# Patient Record
Sex: FEMALE | Race: BLACK OR AFRICAN AMERICAN | NOT HISPANIC OR LATINO | Employment: UNEMPLOYED | URBAN - METROPOLITAN AREA
[De-identification: names, ages, dates, MRNs, and addresses within clinical notes are randomized per-mention and may not be internally consistent; named-entity substitution may affect disease eponyms.]

---

## 2017-03-17 ENCOUNTER — OFFICE VISIT (OUTPATIENT)
Dept: OBSTETRICS AND GYNECOLOGY | Facility: CLINIC | Age: 42
End: 2017-03-17
Payer: COMMERCIAL

## 2017-03-17 VITALS
SYSTOLIC BLOOD PRESSURE: 130 MMHG | DIASTOLIC BLOOD PRESSURE: 80 MMHG | WEIGHT: 208.75 LBS | BODY MASS INDEX: 32.76 KG/M2 | HEIGHT: 67 IN

## 2017-03-17 DIAGNOSIS — K61.1 PERIRECTAL ABSCESS: Primary | ICD-10-CM

## 2017-03-17 PROCEDURE — 87070 CULTURE OTHR SPECIMN AEROBIC: CPT

## 2017-03-17 PROCEDURE — 99214 OFFICE O/P EST MOD 30 MIN: CPT | Mod: S$GLB,,, | Performed by: NURSE PRACTITIONER

## 2017-03-17 PROCEDURE — 99999 PR PBB SHADOW E&M-EST. PATIENT-LVL III: CPT | Mod: PBBFAC,,, | Performed by: NURSE PRACTITIONER

## 2017-03-17 PROCEDURE — 1160F RVW MEDS BY RX/DR IN RCRD: CPT | Mod: S$GLB,,, | Performed by: NURSE PRACTITIONER

## 2017-03-17 RX ORDER — LOSARTAN POTASSIUM 100 MG/1
100 TABLET ORAL
COMMUNITY
Start: 2016-06-03 | End: 2017-03-17

## 2017-03-17 RX ORDER — SULFAMETHOXAZOLE AND TRIMETHOPRIM 800; 160 MG/1; MG/1
1 TABLET ORAL 2 TIMES DAILY
Qty: 20 TABLET | Refills: 0 | Status: SHIPPED | OUTPATIENT
Start: 2017-03-17 | End: 2017-03-27

## 2017-03-17 RX ORDER — VALACYCLOVIR HYDROCHLORIDE 1 G/1
TABLET, FILM COATED ORAL
COMMUNITY
Start: 2016-06-10 | End: 2017-03-17

## 2017-03-17 RX ORDER — AMLODIPINE BESYLATE 10 MG/1
10 TABLET ORAL
COMMUNITY
End: 2017-03-17

## 2017-03-17 NOTE — PROGRESS NOTES
"    Radha Medina is a 41 y.o. female  presents for boil to vaginal region since July - drains off and on but has gotten slightly larger and very tender.  Had seen PCP yesterday and was called rd and started on doxycyline which pt did not start - she did take a dose of something she had in cabinet that was 875 mg - counseled pt on taking old medication and not completing it in first place.  LMP: No LMP recorded. Patient has had a hysterectomy..      Past Medical History:   Diagnosis Date    Abnormal Pap smear of cervix     Herpes simplex virus (HSV) infection     Hypertension      Past Surgical History:   Procedure Laterality Date    HYSTERECTOMY      [facility] for hypermen and abnl paps     Social History     Social History    Marital status:      Spouse name: N/A    Number of children: N/A    Years of education: N/A     Occupational History    Not on file.     Social History Main Topics    Smoking status: Never Smoker    Smokeless tobacco: Not on file    Alcohol use No    Drug use: No    Sexual activity: Yes     Partners: Male      Comment:      Other Topics Concern    Not on file     Social History Narrative     Family History   Problem Relation Age of Onset    Diabetes Maternal Grandmother     Diabetes Mother     Breast cancer Neg Hx     Colon cancer Neg Hx     Ovarian cancer Neg Hx      OB History      Para Term  AB TAB SAB Ectopic Multiple Living    2         2          /80  Ht 5' 7" (1.702 m)  Wt 94.7 kg (208 lb 12.4 oz)  BMI 32.7 kg/m2      ROS:  Per hpi    PHYSICAL EXAM:  APPEARANCE: Well nourished, well developed, in no acute distress.  AFFECT: WNL, alert and oriented x 3  SKIN: No acne or hirsutism  PELVIC: abscess appears to be more perirectal just to left side of rectum on left buttock - 3 raised areas of abscess lesions 2 of which are draining - one was opened with 18 gauge needle and thick purelent drainage was noted and " cultured   Physical Exam:               Genitourinary:                           1. Perirectal abscess  sulfamethoxazole-trimethoprim 800-160mg (BACTRIM DS) 800-160 mg Tab    AND PLAN:  Warm soaks in tub bid to tid     Recommend at this point needs to see general surgery for assessment if needs to be surgically I&D due to size and depth and due to length of time has been bothering her (10 months)

## 2017-03-17 NOTE — MR AVS SNAPSHOT
[facility] - OB/ GYN  [address] [facility] [address] [place] [address]  Phone: [phone]  Fax: [phone]                  Radha Medina   3/17/2017 10:45 AM   Office Visit    Description:  Female : 1975   Provider:  Ronit Wilcox NP   Department:  [facility] - OB/ GYN           Reason for Visit     boil in vaginal area           Diagnoses this Visit        Comments    Perirectal abscess    -  Primary            To Do List           Future Appointments        Provider Department Dept Phone    3/20/2017 11:20 AM Peter Clement MD [facility] General Surgery [phone]      Goals (5 Years of Data)     None       These Medications        Disp Refills Start End    sulfamethoxazole-trimethoprim 800-160mg (BACTRIM DS) 800-160 mg Tab 20 tablet 0 3/17/2017 3/27/2017    Take 1 tablet by mouth 2 (two) times daily. - Oral    Pharmacy: [facility] [place] [address] Ph #: [phone]         [facility] On Call     [facility] On Call Nurse Care Line -  Assistance  Registered nurses in the [facility] On Call Center provide clinical advisement, health education, appointment booking, and other advisory services.  Call for this free service at [phone].             Medications           Message regarding Medications     Verify the changes and/or additions to your medication regime listed below are the same as discussed with your clinician today.  If any of these changes or additions are incorrect, please notify your healthcare provider.        START taking these NEW medications        Refills    sulfamethoxazole-trimethoprim 800-160mg (BACTRIM DS) 800-160 mg Tab 0    Sig: Take 1 tablet by mouth 2 (two) times daily.    Class: Normal    Route: Oral      STOP taking these medications     olopatadine (PATANOL) 0.1 % ophthalmic solution 1 drop.           Verify that the below list of medications is an accurate representation of the medications you are currently taking.  If none reported, the  "list may be blank. If incorrect, please contact your healthcare provider. Carry this list with you in case of emergency.           Current Medications     amlodipine (NORVASC) 10 MG tablet Take 10 mg by mouth.    clotrimazole-betamethasone (LOTRISONE) lotion Apply topically.    losartan (COZAAR) 100 MG tablet Take 100 mg by mouth.    valacyclovir (VALTREX) 1000 MG tablet TAKE 1 TABLET BY MOUTH TWICE DAILY    nebivolol (BYSTOLIC) 10 MG Tab Take 10 mg by mouth.    sulfamethoxazole-trimethoprim 800-160mg (BACTRIM DS) 800-160 mg Tab Take 1 tablet by mouth 2 (two) times daily.           Clinical Reference Information           Your Vitals Were     BP Height Weight BMI       130/80 5' 7" (1.702 m) 94.7 kg (208 lb 12.4 oz) 32.7 kg/m2       Blood Pressure          Most Recent Value    BP  130/80      Allergies as of 3/17/2017     No Known Allergies      Immunizations Administered on Date of Encounter - 3/17/2017     None      Language Assistance Services     ATTENTION: Language assistance services are available, free of charge. Please call [phone].      ATENCIÓN: Si chiara mccracken, tiene a reis disposición servicios gratuitos de asistencia lingüística. Llame al [phone].     CARRI Ý: N?u b?n nói Ti?ng Vi?t, có các d?ch v? h? tr? ngôn ng? mi?n phí dành cho b?n. G?i s? [phone].         [facility] - [facility]/ [facility] complies with applicable Federal civil rights laws and does not discriminate on the basis of race, color, national origin, age, disability, or sex.        "

## 2017-03-20 ENCOUNTER — TELEPHONE (OUTPATIENT)
Dept: OBSTETRICS AND GYNECOLOGY | Facility: CLINIC | Age: 42
End: 2017-03-20

## 2017-03-20 ENCOUNTER — LAB VISIT (OUTPATIENT)
Dept: LAB | Facility: HOSPITAL | Age: 42
End: 2017-03-20
Attending: SURGERY
Payer: COMMERCIAL

## 2017-03-20 ENCOUNTER — CLINICAL SUPPORT (OUTPATIENT)
Dept: CARDIOLOGY | Facility: CLINIC | Age: 42
End: 2017-03-20
Payer: COMMERCIAL

## 2017-03-20 ENCOUNTER — OFFICE VISIT (OUTPATIENT)
Dept: SURGERY | Facility: CLINIC | Age: 42
End: 2017-03-20
Payer: COMMERCIAL

## 2017-03-20 VITALS
HEART RATE: 72 BPM | DIASTOLIC BLOOD PRESSURE: 90 MMHG | WEIGHT: 207 LBS | TEMPERATURE: 98 F | BODY MASS INDEX: 32.42 KG/M2 | SYSTOLIC BLOOD PRESSURE: 144 MMHG

## 2017-03-20 DIAGNOSIS — L73.2 HIDRADENITIS SUPPURATIVA OF ANUS: ICD-10-CM

## 2017-03-20 DIAGNOSIS — L73.2 HIDRADENITIS SUPPURATIVA OF ANUS: Primary | ICD-10-CM

## 2017-03-20 LAB
ANION GAP SERPL CALC-SCNC: 11 MMOL/L
BACTERIA GENITAL AEROBE CULT: NORMAL
BASOPHILS # BLD AUTO: 0.02 K/UL
BASOPHILS NFR BLD: 0.2 %
BUN SERPL-MCNC: 14 MG/DL
CALCIUM SERPL-MCNC: 9.7 MG/DL
CHLORIDE SERPL-SCNC: 102 MMOL/L
CO2 SERPL-SCNC: 28 MMOL/L
CREAT SERPL-MCNC: 1.3 MG/DL
DIFFERENTIAL METHOD: ABNORMAL
EOSINOPHIL # BLD AUTO: 0.2 K/UL
EOSINOPHIL NFR BLD: 1.8 %
ERYTHROCYTE [DISTWIDTH] IN BLOOD BY AUTOMATED COUNT: 13.9 %
EST. GFR  (AFRICAN AMERICAN): 58.1 ML/MIN/1.73 M^2
EST. GFR  (NON AFRICAN AMERICAN): 50.4 ML/MIN/1.73 M^2
GLUCOSE SERPL-MCNC: 105 MG/DL
HCT VFR BLD AUTO: 38.7 %
HGB BLD-MCNC: 12.3 G/DL
LYMPHOCYTES # BLD AUTO: 2.6 K/UL
LYMPHOCYTES NFR BLD: 26.3 %
MCH RBC QN AUTO: 27.6 PG
MCHC RBC AUTO-ENTMCNC: 31.8 %
MCV RBC AUTO: 87 FL
MONOCYTES # BLD AUTO: 0.5 K/UL
MONOCYTES NFR BLD: 5.3 %
NEUTROPHILS # BLD AUTO: 6.4 K/UL
NEUTROPHILS NFR BLD: 66 %
PLATELET # BLD AUTO: 314 K/UL
PMV BLD AUTO: 10.7 FL
POTASSIUM SERPL-SCNC: 3.1 MMOL/L
RBC # BLD AUTO: 4.46 M/UL
SODIUM SERPL-SCNC: 141 MMOL/L
WBC # BLD AUTO: 9.73 K/UL

## 2017-03-20 PROCEDURE — 85025 COMPLETE CBC W/AUTO DIFF WBC: CPT

## 2017-03-20 PROCEDURE — 93000 ELECTROCARDIOGRAM COMPLETE: CPT | Mod: S$GLB,,, | Performed by: NUCLEAR MEDICINE

## 2017-03-20 PROCEDURE — 1160F RVW MEDS BY RX/DR IN RCRD: CPT | Mod: S$GLB,,, | Performed by: SURGERY

## 2017-03-20 PROCEDURE — 99203 OFFICE O/P NEW LOW 30 MIN: CPT | Mod: S$GLB,,, | Performed by: SURGERY

## 2017-03-20 PROCEDURE — 99999 PR PBB SHADOW E&M-EST. PATIENT-LVL III: CPT | Mod: PBBFAC,,, | Performed by: SURGERY

## 2017-03-20 PROCEDURE — 80048 BASIC METABOLIC PNL TOTAL CA: CPT

## 2017-03-20 PROCEDURE — 36415 COLL VENOUS BLD VENIPUNCTURE: CPT | Mod: PO

## 2017-03-20 RX ORDER — SODIUM CHLORIDE 9 MG/ML
INJECTION, SOLUTION INTRAVENOUS CONTINUOUS
Status: CANCELLED | OUTPATIENT
Start: 2017-03-20

## 2017-03-20 NOTE — TELEPHONE ENCOUNTER
----- Message from Shavon Chaney sent at 3/20/2017  9:49 AM CDT -----  Contact: [facility]  Caller request call from nurse regarding RX for Bactrim that pt states they should have received but pharmacy states they do not have...  [facility] - [place] - [address]  [address]  [place] [address]  Phone: [phone] Fax: [phone]

## 2017-03-20 NOTE — LETTER
March 20, 2017      Ronit Wilcox, NP  [address] [place] [address]           [facility] General Surgery  [address] [place] [address]  Phone: [phone]  Fax: [phone]          Patient: Radha Medina   MR Number: [id number]   YOB: 1975   Date of Visit: 3/20/2017       Dear Ronit Wilcox:    Thank you for referring Radha Medina to me for evaluation. Attached you will find relevant portions of my assessment and plan of care.    If you have questions, please do not hesitate to call me. I look forward to following Radha Medina along with you.    Sincerely,    Peter Clement MD    Enclosure  CC:  No Recipients    If you would like to receive this communication electronically, please contact [email or web address] or [phone] to request more information on [employer] [employer] access.    For providers and/or their staff who would like to refer a patient to [facility], please contact us through our one-stop-shop provider referral line, Jing Toscano, at [phone].    If you feel you have received this communication in error or would no longer like to receive these types of communications, please e-mail [email or web address]

## 2017-03-20 NOTE — PROGRESS NOTES
History & Physical    SUBJECTIVE:     History of Present Illness:  Patient is a 41 y.o. female referred for hidradenitis. The patient reports left inner thigh chronic abscess that swells and drains intermittently. Despite previous trials of antibiotics this has persisted for the past 10 months.     Chief Complaint   Patient presents with    Consult     Lumps on buttocks       Review of patient's allergies indicates:  No Known Allergies    Current Outpatient Prescriptions   Medication Sig Dispense Refill    amlodipine (NORVASC) 10 MG tablet Take 10 mg by mouth.      clotrimazole-betamethasone (LOTRISONE) lotion Apply topically.      losartan (COZAAR) 100 MG tablet Take 100 mg by mouth.      sulfamethoxazole-trimethoprim 800-160mg (BACTRIM DS) 800-160 mg Tab Take 1 tablet by mouth 2 (two) times daily. 20 tablet 0    valacyclovir (VALTREX) 1000 MG tablet TAKE 1 TABLET BY MOUTH TWICE DAILY      nebivolol (BYSTOLIC) 10 MG Tab Take 10 mg by mouth.       No current facility-administered medications for this visit.        Past Medical History:   Diagnosis Date    Abnormal Pap smear of cervix     Herpes simplex virus (HSV) infection     Hypertension      Past Surgical History:   Procedure Laterality Date    HYSTERECTOMY  2011    [facility] for hypermen and abnl paps     Family History   Problem Relation Age of Onset    Diabetes Maternal Grandmother     Diabetes Mother     Breast cancer Neg Hx     Colon cancer Neg Hx     Ovarian cancer Neg Hx      Social History   Substance Use Topics    Smoking status: Never Smoker    Smokeless tobacco: None    Alcohol use No        Review of Systems:  Review of Systems   Constitutional: Negative for chills and fever.   HENT: Negative for congestion.    Eyes: Negative for visual disturbance.   Respiratory: Negative for cough and shortness of breath.    Cardiovascular: Negative for chest pain, palpitations and leg swelling.   Gastrointestinal: Negative for abdominal distention,  abdominal pain, constipation, diarrhea, nausea and vomiting.   Endocrine: Negative for polyuria.   Genitourinary: Negative for dysuria.   Skin: Positive for wound. Negative for rash.   Neurological: Negative for dizziness and light-headedness.   Hematological: Negative for adenopathy.       OBJECTIVE:     Vital Signs (Most Recent)  Temp: 98.1 °F (36.7 °C) (03/20/17 1057)  Pulse: 72 (03/20/17 1057)  BP: (!) 144/90 (03/20/17 1057)     93.9 kg (207 lb 0.2 oz)     Physical Exam:  Physical Exam   Constitutional: She is oriented to person, place, and time. She appears well-developed and well-nourished.   HENT:   Head: Normocephalic and atraumatic.   Eyes: EOM are normal.   Neck: Neck supple.   Cardiovascular: Normal rate and regular rhythm.    Pulmonary/Chest: Effort normal and breath sounds normal.   Abdominal: Soft. Bowel sounds are normal. She exhibits no distension. There is no tenderness.   Musculoskeletal:   Left inner thigh hidradenitis   Neurological: She is alert and oriented to person, place, and time.   Skin: Skin is warm and dry.   Vitals reviewed.      ASSESSMENT/PLAN:     42 y/o female with hidradenitis of the left inner thigh     PLAN:Plan     -Excision hidradenitis 3/23/17  -Preop: cbc, bmp, ekg, UPT morning of surgery  -Risks and benefits discussed with patient including pain, bleeding, infection, delayed wound healing, recurrence

## 2017-03-20 NOTE — MR AVS SNAPSHOT
[facility]  [address] [address]  Phone: [phone]  Fax: [phone]                  Radha Medina   3/20/2017 11:20 AM   Office Visit    Description:  Female : 1975   Provider:  Peter Clement MD   Department:  [facility]           Reason for Visit     Consult           Diagnoses this Visit        Comments    Hidradenitis suppurativa of anus    -  Primary            To Do List           Future Appointments        Provider Department Dept Phone    3/20/2017 12:00 PM LAB, SAME DAY [facility] [phone]    [facility]20[facility]2017 12[facility]20 [phone]    4/3/2017 10:40 AM Peter Clement MD [facility] [phone]      Your Future Surgeries/Procedures     Mar 23, 2017   Surgery with Peter Clement MD   [facility]  ([facility])    [address] [address] [phone]              Goals (5 Years of Data)     None      [facility] On Call     [facility] On [facility] Nurse Care Line -  Assistance  Registered nurses in the [facility] On Call Center provide clinical advisement, health education, appointment booking, and other advisory services.  Call for this free service at [phone].             Medications           Message regarding Medications     Verify the changes and/or additions to your medication regime listed below are the same as discussed with your clinician today.  If any of these changes or additions are incorrect, please notify your healthcare provider.             Verify that the below list of medications is an accurate representation of the medications you are currently taking.  If none reported, the list may be blank. If incorrect, please contact your healthcare provider. Carry this list with you in case of emergency.           Current Medications     amlodipine (NORVASC) 10 MG tablet Take 10 mg by mouth.    clotrimazole-betamethasone  (LOTRISONE) lotion Apply topically.    losartan (COZAAR) 100 MG tablet Take 100 mg by mouth.    sulfamethoxazole-trimethoprim 800-160mg (BACTRIM DS) 800-160 mg Tab Take 1 tablet by mouth 2 (two) times daily.    valacyclovir (VALTREX) 1000 MG tablet TAKE 1 TABLET BY MOUTH TWICE DAILY    nebivolol (BYSTOLIC) 10 MG Tab Take 10 mg by mouth.           Clinical Reference Information           Your Vitals Were     BP Pulse Temp Weight BMI    144/90 (BP Location: Right arm, Patient Position: Sitting) 72 98.1 °F (36.7 °C) (Oral) 93.9 kg (207 lb 0.2 oz) 32.42 kg/m2      Blood Pressure          Most Recent Value    BP  (!)  144/90      Allergies as of 3/20/2017     No Known Allergies      Immunizations Administered on Date of Encounter - 3/20/2017     None      Orders Placed During Today's Visit      Normal Orders This Visit    Case Request Operating Room: EXCISION-SKIN     Future Labs/Procedures Expected by Expires    Basic metabolic panel  3/20/2017 5/19/2018    CBC auto differential  3/20/2017 5/19/2018    ECG 12 lead  As directed 3/20/2018      Language Assistance Services     ATTENTION: Language assistance services are available, free of charge. Please call [phone].      ATENCIÓN: Si habla nawaf, tiene a reis disposición servicios gratuitos de asistencia lingüística. Llame al [phone].     [facility] Ý: N?u b?n nói Ti?ng Vi?t, có các d?ch v? h? tr? ngôn ng? mi?n phí dành cho b?n. G?i s? [phone].         [facility] complies with applicable Federal civil rights laws and does not discriminate on the basis of race, color, national origin, age, disability, or sex.

## 2017-03-21 NOTE — PRE-PROCEDURE INSTRUCTIONS
Pre op instructions reviewed with patient per phone:    To confirm, Your surgeon has instructed you:  Surgery is scheduled 3/23/17 at 0900.      Please report to [facility] 1st floor main lobby by 0730 Pre admit office will call day prior to surgery for final arrival time.      INSTRUCTIONS IMPORTANT!!!  ¨ Do not eat, drink, or smoke after 12 midnight-including water. OK to brush teeth, no gum, candy or mints!    ¨ Take only these medicines with a small swallow of water-morning of surgery.  Amlodipine, Losartan     ____  Do not wear makeup, including mascara.  ____  No powder, lotions or creams to surgical area.  ____  Please remove all jewelry, including piercings and leave at home.  ____  No money or valuables needed. Please leave at home.  ____  Please bring identification and insurance information to hospital.  ____  If going home the same day, arrange for a ride home. You will not be able to   drive if Anesthesia was used.  ____  Children, under 12 years old, must remain in the waiting room with an adult.  They are not allowed in patient areas.  ____  Wear loose fitting clothing. Allow for dressings, bandages.  ____  Stop Aspirin, Ibuprofen, Motrin and Aleve at least 5-7 days before surgery, unless otherwise instructed by your doctor, or the nurse.   You MAY use Tylenol/acetaminophen until day of surgery.  ____  If you take diabetic medication, do not take am of surgery unless instructed by   Doctor.  ____ Stop taking any Fish Oil supplement or any Vitamins that contain Vitamin E at least 5 days prior to surgery.          Bathing Instructions-- The night before surgery and the morning prior to coming to the hospital:   -Do not shave the surgical area.   -Shower and wash your hair and body as usual with anti-bacterial  soap and shampoo.   -Rinse your hair and body completely.   -Use one packet of hibiclens to wash the surgical site (using your hand) gently for 5 minutes.  Do not scrub  you skin too hard.   -Do not use hibiclens on your head, face, or genitals.   -Do not wash with anti-bacterial soap after you use the hibiclens.   -Rinse your body thoroughly.   -Dry with clean, soft towel.  Do not use lotion, cream, deodorant, or powders on   the surgical site.    Use antibacterial soap in place of hibiclens if your surgery is on the head, face or genitals.         Surgical Site Infection    Prevention of surgical site infections:     -Keep incisions clean and dry.   -Do not soak/submerge incisions in water until completely healed.   -Do not apply lotions, powders, creams, or deodorants to site.   -Always make sure hands are cleaned with antibacterial soap/ alcohol-based   prior to touching the surgical site.  (This includes doctors, nurses, staff, and yourself.)    Signs and symptoms:   -Redness and pain around the area where you had surgery   -Drainage of cloudy fluid from your surgical wound   -Fever over 100.4  I have read or had read and explained to me, and understand the above information.

## 2017-03-22 ENCOUNTER — ANESTHESIA EVENT (OUTPATIENT)
Dept: SURGERY | Facility: HOSPITAL | Age: 42
End: 2017-03-22
Payer: COMMERCIAL

## 2017-03-23 ENCOUNTER — ANESTHESIA (OUTPATIENT)
Dept: SURGERY | Facility: HOSPITAL | Age: 42
End: 2017-03-23
Payer: COMMERCIAL

## 2017-03-23 ENCOUNTER — SURGERY (OUTPATIENT)
Age: 42
End: 2017-03-23

## 2017-03-23 ENCOUNTER — HOSPITAL ENCOUNTER (OUTPATIENT)
Facility: HOSPITAL | Age: 42
Discharge: HOME | End: 2017-03-23
Attending: SURGERY | Admitting: SURGERY
Payer: COMMERCIAL

## 2017-03-23 VITALS
OXYGEN SATURATION: 100 % | DIASTOLIC BLOOD PRESSURE: 88 MMHG | SYSTOLIC BLOOD PRESSURE: 137 MMHG | WEIGHT: 206.81 LBS | BODY MASS INDEX: 33.24 KG/M2 | TEMPERATURE: 98 F | HEART RATE: 72 BPM | RESPIRATION RATE: 12 BRPM | HEIGHT: 66 IN

## 2017-03-23 DIAGNOSIS — L73.2 HIDRADENITIS SUPPURATIVA OF ANUS: ICD-10-CM

## 2017-03-23 PROCEDURE — 37000009 HC ANESTHESIA EA ADD 15 MINS: Performed by: SURGERY

## 2017-03-23 PROCEDURE — 71000015 HC POSTOP RECOV 1ST HR: Performed by: SURGERY

## 2017-03-23 PROCEDURE — 88305 TISSUE EXAM BY PATHOLOGIST: CPT | Mod: 26,,, | Performed by: PATHOLOGY

## 2017-03-23 PROCEDURE — 36000707: Performed by: SURGERY

## 2017-03-23 PROCEDURE — 25000003 PHARM REV CODE 250: Performed by: ANESTHESIOLOGY

## 2017-03-23 PROCEDURE — 25000003 PHARM REV CODE 250: Performed by: SURGERY

## 2017-03-23 PROCEDURE — 63600175 PHARM REV CODE 636 W HCPCS: Performed by: NURSE ANESTHETIST, CERTIFIED REGISTERED

## 2017-03-23 PROCEDURE — 25000003 PHARM REV CODE 250: Performed by: NURSE ANESTHETIST, CERTIFIED REGISTERED

## 2017-03-23 PROCEDURE — 88305 TISSUE EXAM BY PATHOLOGIST: CPT | Performed by: PATHOLOGY

## 2017-03-23 PROCEDURE — 37000008 HC ANESTHESIA 1ST 15 MINUTES: Performed by: SURGERY

## 2017-03-23 PROCEDURE — 11470 EXC SKN H/P/P/U SMPL/NTRM: CPT | Mod: ,,, | Performed by: SURGERY

## 2017-03-23 PROCEDURE — 71000033 HC RECOVERY, INTIAL HOUR: Performed by: SURGERY

## 2017-03-23 PROCEDURE — 63600175 PHARM REV CODE 636 W HCPCS: Performed by: SURGERY

## 2017-03-23 PROCEDURE — 71000039 HC RECOVERY, EACH ADD'L HOUR: Performed by: SURGERY

## 2017-03-23 PROCEDURE — 36000706: Performed by: SURGERY

## 2017-03-23 RX ORDER — FENTANYL CITRATE 50 UG/ML
25 INJECTION, SOLUTION INTRAMUSCULAR; INTRAVENOUS EVERY 5 MIN PRN
Status: DISCONTINUED | OUTPATIENT
Start: 2017-03-23 | End: 2017-03-23 | Stop reason: HOSPADM

## 2017-03-23 RX ORDER — SODIUM CHLORIDE 9 MG/ML
INJECTION, SOLUTION INTRAVENOUS CONTINUOUS
Status: DISCONTINUED | OUTPATIENT
Start: 2017-03-23 | End: 2017-03-23 | Stop reason: HOSPADM

## 2017-03-23 RX ORDER — LIDOCAINE HYDROCHLORIDE 10 MG/ML
1 INJECTION, SOLUTION EPIDURAL; INFILTRATION; INTRACAUDAL; PERINEURAL ONCE
Status: DISCONTINUED | OUTPATIENT
Start: 2017-03-23 | End: 2017-03-23 | Stop reason: HOSPADM

## 2017-03-23 RX ORDER — ROCURONIUM BROMIDE 10 MG/ML
INJECTION, SOLUTION INTRAVENOUS
Status: DISCONTINUED | OUTPATIENT
Start: 2017-03-23 | End: 2017-03-23

## 2017-03-23 RX ORDER — SODIUM CHLORIDE 9 MG/ML
3 INJECTION, SOLUTION INTRAMUSCULAR; INTRAVENOUS; SUBCUTANEOUS EVERY 8 HOURS
Status: DISCONTINUED | OUTPATIENT
Start: 2017-03-23 | End: 2017-03-23 | Stop reason: HOSPADM

## 2017-03-23 RX ORDER — MEPERIDINE HYDROCHLORIDE 50 MG/ML
12.5 INJECTION INTRAMUSCULAR; INTRAVENOUS; SUBCUTANEOUS ONCE AS NEEDED
Status: COMPLETED | OUTPATIENT
Start: 2017-03-23 | End: 2017-03-23

## 2017-03-23 RX ORDER — ONDANSETRON 2 MG/ML
INJECTION INTRAMUSCULAR; INTRAVENOUS
Status: DISCONTINUED | OUTPATIENT
Start: 2017-03-23 | End: 2017-03-23

## 2017-03-23 RX ORDER — BUPIVACAINE HYDROCHLORIDE 2.5 MG/ML
INJECTION, SOLUTION EPIDURAL; INFILTRATION; INTRACAUDAL
Status: DISCONTINUED | OUTPATIENT
Start: 2017-03-23 | End: 2017-03-23 | Stop reason: HOSPADM

## 2017-03-23 RX ORDER — ONDANSETRON 2 MG/ML
4 INJECTION INTRAMUSCULAR; INTRAVENOUS DAILY PRN
Status: DISCONTINUED | OUTPATIENT
Start: 2017-03-23 | End: 2017-03-23 | Stop reason: HOSPADM

## 2017-03-23 RX ORDER — DIPHENHYDRAMINE HYDROCHLORIDE 50 MG/ML
25 INJECTION INTRAMUSCULAR; INTRAVENOUS EVERY 6 HOURS PRN
Status: DISCONTINUED | OUTPATIENT
Start: 2017-03-23 | End: 2017-03-23 | Stop reason: HOSPADM

## 2017-03-23 RX ORDER — OXYCODONE HYDROCHLORIDE 5 MG/1
5 TABLET ORAL
Status: DISCONTINUED | OUTPATIENT
Start: 2017-03-23 | End: 2017-03-23 | Stop reason: HOSPADM

## 2017-03-23 RX ORDER — LIDOCAINE HYDROCHLORIDE 10 MG/ML
INJECTION, SOLUTION EPIDURAL; INFILTRATION; INTRACAUDAL; PERINEURAL
Status: DISCONTINUED | OUTPATIENT
Start: 2017-03-23 | End: 2017-03-23 | Stop reason: HOSPADM

## 2017-03-23 RX ORDER — LORAZEPAM 2 MG/ML
0.25 INJECTION INTRAMUSCULAR ONCE AS NEEDED
Status: DISCONTINUED | OUTPATIENT
Start: 2017-03-23 | End: 2017-03-23 | Stop reason: HOSPADM

## 2017-03-23 RX ORDER — ONDANSETRON 2 MG/ML
4 INJECTION INTRAMUSCULAR; INTRAVENOUS EVERY 12 HOURS PRN
Status: DISCONTINUED | OUTPATIENT
Start: 2017-03-23 | End: 2017-03-23 | Stop reason: HOSPADM

## 2017-03-23 RX ORDER — MORPHINE SULFATE 10 MG/ML
2 INJECTION INTRAMUSCULAR; INTRAVENOUS; SUBCUTANEOUS EVERY 4 HOURS PRN
Status: DISCONTINUED | OUTPATIENT
Start: 2017-03-23 | End: 2017-03-23 | Stop reason: HOSPADM

## 2017-03-23 RX ORDER — LIDOCAINE HYDROCHLORIDE 10 MG/ML
INJECTION INFILTRATION; PERINEURAL
Status: DISCONTINUED | OUTPATIENT
Start: 2017-03-23 | End: 2017-03-23

## 2017-03-23 RX ORDER — SUCCINYLCHOLINE CHLORIDE 20 MG/ML
INJECTION INTRAMUSCULAR; INTRAVENOUS
Status: DISCONTINUED | OUTPATIENT
Start: 2017-03-23 | End: 2017-03-23

## 2017-03-23 RX ORDER — HYDROMORPHONE HYDROCHLORIDE 2 MG/ML
0.2 INJECTION, SOLUTION INTRAMUSCULAR; INTRAVENOUS; SUBCUTANEOUS EVERY 5 MIN PRN
Status: DISCONTINUED | OUTPATIENT
Start: 2017-03-23 | End: 2017-03-23 | Stop reason: HOSPADM

## 2017-03-23 RX ORDER — SODIUM CHLORIDE, SODIUM LACTATE, POTASSIUM CHLORIDE, CALCIUM CHLORIDE 600; 310; 30; 20 MG/100ML; MG/100ML; MG/100ML; MG/100ML
INJECTION, SOLUTION INTRAVENOUS CONTINUOUS PRN
Status: DISCONTINUED | OUTPATIENT
Start: 2017-03-23 | End: 2017-03-23

## 2017-03-23 RX ORDER — MIDAZOLAM HYDROCHLORIDE 1 MG/ML
INJECTION, SOLUTION INTRAMUSCULAR; INTRAVENOUS
Status: DISCONTINUED | OUTPATIENT
Start: 2017-03-23 | End: 2017-03-23

## 2017-03-23 RX ORDER — HYDROCODONE BITARTRATE AND ACETAMINOPHEN 5; 325 MG/1; MG/1
1 TABLET ORAL EVERY 4 HOURS PRN
Qty: 20 TABLET | Refills: 0 | Status: SHIPPED | OUTPATIENT
Start: 2017-03-23 | End: 2017-04-18 | Stop reason: SDUPTHER

## 2017-03-23 RX ORDER — CEFAZOLIN SODIUM 2 G/50ML
2 SOLUTION INTRAVENOUS
Status: COMPLETED | OUTPATIENT
Start: 2017-03-23 | End: 2017-03-23

## 2017-03-23 RX ORDER — FENTANYL CITRATE 50 UG/ML
INJECTION, SOLUTION INTRAMUSCULAR; INTRAVENOUS
Status: DISCONTINUED | OUTPATIENT
Start: 2017-03-23 | End: 2017-03-23

## 2017-03-23 RX ORDER — HYDROCODONE BITARTRATE AND ACETAMINOPHEN 5; 325 MG/1; MG/1
2 TABLET ORAL EVERY 4 HOURS PRN
Status: DISCONTINUED | OUTPATIENT
Start: 2017-03-23 | End: 2017-03-23 | Stop reason: HOSPADM

## 2017-03-23 RX ORDER — PROPOFOL 10 MG/ML
VIAL (ML) INTRAVENOUS
Status: DISCONTINUED | OUTPATIENT
Start: 2017-03-23 | End: 2017-03-23

## 2017-03-23 RX ADMIN — ROCURONIUM BROMIDE 5 MG: 10 INJECTION, SOLUTION INTRAVENOUS at 09:03

## 2017-03-23 RX ADMIN — ONDANSETRON 4 MG: 2 INJECTION, SOLUTION INTRAMUSCULAR; INTRAVENOUS at 09:03

## 2017-03-23 RX ADMIN — CEFAZOLIN SODIUM 2 G: 2 SOLUTION INTRAVENOUS at 09:03

## 2017-03-23 RX ADMIN — LIDOCAINE HYDROCHLORIDE 5 ML: 10 INJECTION, SOLUTION EPIDURAL; INFILTRATION; INTRACAUDAL; PERINEURAL at 09:03

## 2017-03-23 RX ADMIN — MIDAZOLAM HYDROCHLORIDE 2 MG: 1 INJECTION, SOLUTION INTRAMUSCULAR; INTRAVENOUS at 09:03

## 2017-03-23 RX ADMIN — SUCCINYLCHOLINE CHLORIDE 60 MG: 20 INJECTION, SOLUTION INTRAMUSCULAR; INTRAVENOUS at 09:03

## 2017-03-23 RX ADMIN — PROPOFOL 150 MG: 10 INJECTION, EMULSION INTRAVENOUS at 09:03

## 2017-03-23 RX ADMIN — SODIUM CHLORIDE, SODIUM LACTATE, POTASSIUM CHLORIDE, AND CALCIUM CHLORIDE: 600; 310; 30; 20 INJECTION, SOLUTION INTRAVENOUS at 09:03

## 2017-03-23 RX ADMIN — FENTANYL CITRATE 100 MCG: 50 INJECTION, SOLUTION INTRAMUSCULAR; INTRAVENOUS at 09:03

## 2017-03-23 RX ADMIN — LIDOCAINE HYDROCHLORIDE 80 MG: 10 INJECTION, SOLUTION INFILTRATION; PERINEURAL at 09:03

## 2017-03-23 RX ADMIN — HYDROCODONE BITARTRATE AND ACETAMINOPHEN 2 TABLET: 5; 325 TABLET ORAL at 10:03

## 2017-03-23 RX ADMIN — FENTANYL CITRATE 50 MCG: 50 INJECTION, SOLUTION INTRAMUSCULAR; INTRAVENOUS at 09:03

## 2017-03-23 RX ADMIN — SUCCINYLCHOLINE CHLORIDE 140 MG: 20 INJECTION, SOLUTION INTRAMUSCULAR; INTRAVENOUS at 09:03

## 2017-03-23 RX ADMIN — MEPERIDINE HYDROCHLORIDE 12.5 MG: 50 INJECTION INTRAMUSCULAR; INTRAVENOUS; SUBCUTANEOUS at 10:03

## 2017-03-23 RX ADMIN — BUPIVACAINE HYDROCHLORIDE 10 ML: 2.5 INJECTION, SOLUTION EPIDURAL; INFILTRATION; INTRACAUDAL; PERINEURAL at 09:03

## 2017-03-23 NOTE — ANESTHESIA PREPROCEDURE EVALUATION
03/23/2017  Radha Flores is a 41 y.o., female.    [facility] Anesthesia Evaluation    I have reviewed the Patient Summary Reports.        Review of Systems  Anesthesia Hx:  No problems with previous Anesthesia    Cardiovascular:   Hypertension Denies MI.   NYHA Classification I ECG has been reviewed.  Functional Capacity 10 METS  Hypertension    Pulmonary:   Sleep Apnea        Physical Exam  General:  Well nourished    Airway/Jaw/Neck:  Airway Findings: Mouth Opening: Normal General Airway Assessment: Adult  Mallampati: II  TM Distance: Normal, at least 6 cm       Chest/Lungs:  Chest/Lungs Findings: Clear to auscultation     Heart/Vascular:  Heart Findings: Rate: Normal             Anesthesia Plan  Type of Anesthesia, risks & benefits discussed:  Anesthesia Type:  general  Patient's Preference:   Intra-op Monitoring Plan:   Intra-op Monitoring Plan Comments:   Post Op Pain Control Plan:   Post Op Pain Control Plan Comments:   Induction:   IV  Beta Blocker:  Patient is not currently on a Beta-Blocker (No further documentation required).       Informed Consent: Patient understands risks and agrees with Anesthesia plan.  Questions answered.   ASA Score: 2     Day of Surgery Review of History & Physical: I have interviewed and examined the patient. I have reviewed the patient's H&P dated:  There are no significant changes.          Ready For Surgery From Anesthesia Perspective.

## 2017-03-23 NOTE — IP AVS SNAPSHOT
[address] Anupam [place]DENAS [address]           Patient Discharge Instructions     Our goal is to set you up for success. This packet includes information on your condition, medications, and your home care. It will help you to care for yourself so you don't get sicker and need to go back to the hospital.     Please ask your nurse if you have any questions.        There are many details to remember when preparing to leave the hospital. Here is what you will need to do:    1. Take your medicine. If you are prescribed medications, review your Medication List in the following pages. You may have new medications to  at the pharmacy and others that you'll need to stop taking. Review the instructions for how and when to take your medications. Talk with your doctor or nurses if you are unsure of what to do.     2. Go to your follow-up appointments. Specific follow-up information is listed in the following pages. Your may be contacted by a transition nurse or clinical provider about future appointments. Be sure we have all of the phone numbers to reach you, if needed. Please contact your provider's office if you are unable to make an appointment.     3. Watch for warning signs. Your doctor or nurse will give you detailed warning signs to watch for and when to call for assistance. These instructions may also include educational information about your condition. If you experience any of warning signs to your health, call your doctor.               ** Verify the list of medication(s) below is accurate and up to date. Carry this with you in case of emergency. If your medications have changed, please notify your healthcare provider.             Medication List      START taking these medications        Additional Info                      hydrocodone-acetaminophen 5-325mg 5-325 mg per tablet   Commonly known as:  NORCO   Quantity:  20 tablet   Refills:  0   Dose:  1 tablet     Instructions:  Take 1 tablet by mouth every 4 (four) hours as needed for Pain.     Begin Date    AM    Noon    PM    Bedtime         CONTINUE taking these medications        Additional Info                      amlodipine 10 MG tablet   Commonly known as:  NORVASC   Refills:  0   Dose:  10 mg    Instructions:  Take 10 mg by mouth.     Begin Date    AM    Noon    PM    Bedtime       COZAAR 100 MG tablet   Refills:  0   Dose:  100 mg   Generic drug:  losartan    Instructions:  Take 100 mg by mouth.     Begin Date    AM    Noon    PM    Bedtime       nebivolol 10 MG Tab   Commonly known as:  BYSTOLIC   Refills:  0   Dose:  10 mg    Instructions:  Take 10 mg by mouth.     Begin Date    AM    Noon    PM    Bedtime       sulfamethoxazole-trimethoprim 800-160mg 800-160 mg Tab   Commonly known as:  BACTRIM DS   Quantity:  20 tablet   Refills:  0   Dose:  1 tablet    Instructions:  Take 1 tablet by mouth 2 (two) times daily.     Begin Date    AM    Noon    PM    Bedtime       valacyclovir 1000 MG tablet   Commonly known as:  VALTREX   Refills:  0    Instructions:  TAKE 1 TABLET BY MOUTH TWICE DAILY     Begin Date    AM    Noon    PM    Bedtime         ASK your doctor about these medications        Additional Info                      clotrimazole-betamethasone lotion   Commonly known as:  LOTRISONE   Refills:  0    Instructions:  Apply topically.     Begin Date    AM    Noon    PM    Bedtime            Where to Get Your Medications      These medications were sent to [facility] - [place] - [address]  [address] [place] [address]     Phone:  [phone]     hydrocodone-acetaminophen 5-325mg 5-325 mg per tablet                  Please bring to all follow up appointments:    1. A copy of your discharge instructions.  2. All medicines you are currently taking in their original bottles.  3. Identification and insurance card.    Please arrive 15 minutes ahead of scheduled appointment  time.    Please call 24 hours in advance if you must reschedule your appointment and/or time.        Your Scheduled Appointments     Apr 03, 2017 10:40 AM CDT   Post OP with Peter Clement MD   [facility] General Surgery ([facility])    [address] [facility]  [place] [id number] [phone]              Follow-up Information     Follow up with Peter Clement MD In 2 weeks.    Specialty:  General Surgery    Contact information:    [address] [place] [address] DR Anupam CARDENAS [address] [phone]          Discharge Instructions     Future Orders    Activity as tolerated     Call MD for:  difficulty breathing, headache or visual disturbances     Call MD for:  extreme fatigue     Call MD for:  hives     Call MD for:  persistent dizziness or light-headedness     Call MD for:  persistent nausea and vomiting     Call MD for:  redness, tenderness, or signs of infection (pain, swelling, redness, odor or green/yellow discharge around incision site)     Call MD for:  severe uncontrolled pain     Call MD for:  temperature >100.4     Diet general     Questions:    Total calories:      Fat restriction, if any:      Protein restriction, if any:      Na restriction, if any:      Fluid restriction:      Additional restrictions:      No dressing needed     Other restrictions (specify):     Comments:    Avoid sitting over area and direct pressure. Lying or standing ok. May need donut to sit on.    Shower on day dressing removed (No bath)     Comments:    48 hours        Discharge Instructions         Incision Care  Remember: Follow-up visits allow your doctor to make sure your incision is healing well. Be sure to keep your appointments.   Stitches (sutures), surgical staples, special strips of surgical tape called Steri-Strips, or surgical skin glue may be used to close incisions. They also help stop bleeding and speed healing. To help your incision heal, follow the tips on this handout.  Home care     Steri-Strips   · Always wash your hands before  touching your incision.  · Keep your incision clean and dry.  · Avoid doing things that could cause dirt or sweat to get on your incision.  · Dont pick at scabs. They help protect the wound.  · Keep your incision out of water.  · Take a sponge bath to avoid getting your incision wet, unless your healthcare provider tells you otherwise.  · Ask your provider when can you take a shower or bathe.  · Ask your provider about the best way to keep your incision dry when bathing or showering.  · Pat sutures dry if they get wet. Dont rub.  · Leave the bandage (dressing) in place until you are told to remove it or change it. Change it only as directed, using clean hands.  · After the first 12 hours, change your dressing every 24 hours, or as directed by your healthcare provider.  · Change your dressing if it gets wet or soiled.  Care for specific closures  Follow these guidelines unless your healthcare provider tells you otherwise:  · Sutures or staples. Once you no longer need to keep these dry, clean the wound daily. First remove the bandage using clean hands. Then wash the area gently with soap and warm water. Use a wet cotton swab to loosen and remove any blood or crust that forms. After cleaning, put a thin layer of antibiotic ointment on. Then put on a new bandage.  · Skin glue. Dont put liquid, ointment, or cream on your wound while the glue is in place. Avoid activities that cause heavy sweating. Protect the wound from sunlight. Do not scratch, rub, or pick at the glue. Do not put tape directly over the glue. The glue should peel off within 5 to 10 days.  · Surgical tape. Keep the area dry. If it gets wet, blot the area dry with a clean towel. Surgical tape usually falls off within 7 to 10 days. If it has not fallen off after 10 days, contact your healthcare provider before taking it off yourself. If you are told to remove the tape, put mineral oil or petroleum jelly on a cotton ball. Gently rub the tape until it is  removed.  Changing your dressing     Wash your hands before changing a dressing.    Leave the dressing (bandage) in place until you are told to remove it or change it. Follow the instructions below unless told otherwise by your healthcare provider.  · Always wash your hands before changing your dressing.  · After the first 48 hours the incision wound usually will have closed. At this point, leave the incision uncovered and open to the air. If the incision has not closed keep it covered.  · Cover your incision only if your clothing is rubbing it or causing irritation.  · Change your dressing if it gets wet or soiled.  Follow-up care  Follow up with your healthcare provider to ask how long sutures or staples should be left in place. Be sure to return for suture or staple removal as directed. If dissolving stitches were used in your mouth, these will not need to be removed. They should fall out or dissolve on their own.  If tape closures were used, remove them yourself when your provider recommends if they have not fallen off on their own. If skin glue was used, the glue will wear off by itself.  When to seek medical care  Call your healthcare provider if you have any of the following:  · More pain, redness, swelling, bleeding, or foul-smelling discharge around the incision area  · Fever of 100.4°F (38ºC) or higher or as directed by your healthcare provider  · Shaking chills  · Vomiting or nausea that doesn't go away  · Numbness, coldness, or tingling around the incision area, or changes in skin color  · Opening of the sutures or wound  · Stitches or staples come apart or fall out or surgical tape falls off before 7 days or as directed by your healthcare provider   Date Last Reviewed: 10/16/2014  © 1328-0539 [employer]. [address] [place] [address], [place] [address]. All rights reserved. This information is not intended as a substitute for professional medical care. Always follow your healthcare  professional's instructions.    General Information:    1. Do not drink alcoholic beverages including beer for 24 hours or as long as you are on pain medication..  2. Do not drive a motor vehicle, operate machinery or power tools, or signs legal papers for 24 hours or as long as you are on pain medication.   3. You may experience light-headedness, dizziness, and sleepiness following surgery. Please do not stay alone. A responsible adult should be with you for this 24 hour period.  4. Go home and rest.  5. Progress slowly to a normal diet unless instructed.  Otherwise, begin with liquids such as soft drinks, then soup and crackers working up to solid foods. Drink plenty of nonalcoholic fluids.  6. Certain anesthetics and pain medications produce nausea and vomiting in certain individuals. If nausea becomes a problem at home, call you doctor.  7. A nurse will be calling you sometime after surgery. Do not be alarmed. This is our way of finding out how you are doing.  8. Several times every hour while you are awake, take 2-3 deep breaths and cough. If you had stomach surgery hold a pillow or rolled towel firmly against your stomach before you cough. This will help with any pain the cough might cause.  9. Several times every hour while you are awake, pump and flex your feet 5-6 times and do foot circles. This will help prevent blood clots.  10. Call your doctor for severe pain, bleeding, fever, or signs or symptoms of infection (pain, swelling, redness, foul odor, drainage).  11. You can contact your doctor anytime by [phone] for the [facility] (at [facility]) or [phone] for the '[facility] on [facility].   [email or web address] is another way to contact your doctor if you are an active participant online with [facility].      Acetaminophen; Hydrocodone tablets or capsules  What is this medicine?  ACETAMINOPHEN; HYDROCODONE (a set a FELI jemal fen; sallie droe KOE done) is a pain reliever. It is used  to treat moderate to severe pain.  How should I use this medicine?  Take this medicine by mouth with a glass of water. Follow the directions on the prescription label. You can take it with or without food. If it upsets your stomach, take it with food. Do not take your medicine more often than directed.  A special MedGuide will be given to you by the pharmacist with each prescription and refill. Be sure to read this information carefully each time.  Talk to your pediatrician regarding the use of this medicine in children. Special care may be needed.  What side effects may I notice from receiving this medicine?  Side effects that you should report to your doctor or health care professional as soon as possible:  · allergic reactions like skin rash, itching or hives, swelling of the face, lips, or tongue  · breathing problems  · confusion  · redness, blistering, peeling or loosening of the skin, including inside the mouth  · signs and symptoms of low blood pressure like dizziness; feeling faint or lightheaded, falls; unusually weak or tired  · trouble passing urine or change in the amount of urine  · yellowing of the eyes or skin  Side effects that usually do not require medical attention (report to your doctor or health care professional if they continue or are bothersome):  · constipation  · dry mouth  · nausea, vomiting  · tiredness  What may interact with this medicine?  This medicine may interact with the following medications:  · alcohol  · antiviral medicines for HIV or AIDS  · atropine  · antihistamines for allergy, cough and cold  · certain antibiotics like erythromycin, clarithromycin  · certain medicines for anxiety or sleep  · certain medicines for bladder problems like oxybutynin, tolterodine  · certain medicines for depression like amitriptyline, fluoxetine, sertraline  · certain medicines for fungal infections like ketoconazole and itraconazole  · certain medicines for Parkinson's disease like  benztropine, trihexyphenidyl  · certain medicines for seizures like carbamazepine, phenobarbital, phenytoin, primidone  · certain medicines for stomach problems like dicyclomine, hyoscyamine  · certain medicines for travel sickness like scopolamine  · general anesthetics like halothane, isoflurane, methoxyflurane, propofol  · ipratropium  · local anesthetics like lidocaine, pramoxine, tetracaine  · MAOIs like Carbex, Eldepryl, Marplan, Nardil, and Parnate  · medicines that relax muscles for surgery  · other medicines with acetaminophen  · other narcotic medicines for pain or cough  · phenothiazines like chlorpromazine, mesoridazine, prochlorperazine, thioridazine  · rifampin  What if I miss a dose?  If you miss a dose, take it as soon as you can. If it is almost time for your next dose, take only that dose. Do not take double or extra doses.  Where should I keep my medicine?  Keep out of the reach of children. This medicine can be abused. Keep your medicine in a safe place to protect it from theft. Do not share this medicine with anyone. Selling or giving away this medicine is dangerous and against the law.  This medicine may cause accidental overdose and death if it taken by other adults, children, or pets. Mix any unused medicine with a substance like cat litter or coffee grounds. Then throw the medicine away in a sealed container like a sealed bag or a coffee can with a lid. Do not use the medicine after the expiration date.  Store at room temperature between 15 and 30 degrees C (59 and 86 degrees F).  What should I tell my health care provider before I take this medicine?  They need to know if you have any of these conditions:  · brain tumor  · Crohn's disease, inflammatory bowel disease, or ulcerative colitis  · drug abuse or addiction  · head injury  · heart or circulation problems  · if you often drink alcohol  · kidney disease or problems going to the bathroom  · liver disease  · lung disease, asthma, or  breathing problems  · an unusual or allergic reaction to acetaminophen, hydrocodone, other opioid analgesics, other medicines, foods, dyes, or preservatives  · pregnant or trying to get pregnant  · breast-feeding  What should I watch for while using this medicine?  Tell your doctor or health care professional if your pain does not go away, if it gets worse, or if you have new or a different type of pain. You may develop tolerance to the medicine. Tolerance means that you will need a higher dose of the medicine for pain relief. Tolerance is normal and is expected if you take the medicine for a long time.  Do not suddenly stop taking your medicine because you may develop a severe reaction. Your body becomes used to the medicine. This does NOT mean you are addicted. Addiction is a behavior related to getting and using a drug for a non-medical reason. If you have pain, you have a medical reason to take pain medicine. Your doctor will tell you how much medicine to take. If your doctor wants you to stop the medicine, the dose will be slowly lowered over time to avoid any side effects.  There are different types of narcotic medicines (opiates). If you take more than one type at the same time or if you are taking another medicine that also causes drowsiness, you may have more side effects. Give your health care provider a list of all medicines you use. Your doctor will tell you how much medicine to take. Do not take more medicine than directed. Call emergency for help if you have problems breathing or unusual sleepiness.  Do not take other medicines that contain acetaminophen with this medicine. Always read labels carefully. If you have questions, ask your doctor or pharmacist.  If you take too much acetaminophen get medical help right away. Too much acetaminophen can be very dangerous and cause liver damage. Even if you do not have symptoms, it is important to get help right away.  You may get drowsy or dizzy. Do not  "drive, use machinery, or do anything that needs mental alertness until you know how this medicine affects you. Do not stand or sit up quickly, especially if you are an older patient. This reduces the risk of dizzy or fainting spells. Alcohol may interfere with the effect of this medicine. Avoid alcoholic drinks.  The medicine will cause constipation. Try to have a bowel movement at least every 2 to 3 days. If you do not have a bowel movement for 3 days, call your doctor or health care professional.  Your mouth may get dry. Chewing sugarless gum or sucking hard candy, and drinking plenty of water may help. Contact your doctor if the problem does not go away or is severe.  Date Last Reviewed:   NOTE:This sheet is a summary. It may not cover all possible information. If you have questions about this medicine, talk to your doctor, pharmacist, or health care provider. Copyright© 2016 [employer]            Primary Diagnosis     Your primary diagnosis was:  Hidradenitis Suppurativa Of Anus      Admission Information     Date & Time Provider Department CSN    3/23/2017  7:28 AM Peter Clement MD [facility] -  [id number]      Care Providers     Provider Role Specialty Primary office phone    Peter Clement MD Attending Provider General Surgery [phone]    Peter Clement MD Surgeon  General Surgery [phone]      Your Vitals Were     BP Pulse Temp Resp    134/92 (BP Location: Right arm, Patient Position: Lying, BP Method: Automatic) 85 97.9 °F (36.6 °C) (Temporal) 20    Height Weight SpO2 BMI    5' 6" (1.676 m) 93.8 kg (206 lb 12.7 oz) 99% 33.38 kg/m2      Recent Lab Values     No lab values to display.      Pending Labs     Order Current Status    Pregnancy, urine rapid In process    Specimen to Pathology - Surgery In process      Allergies as of 3/23/2017     No Known Allergies      [facility] Care [facility] - 24/7 Assistance  Unless otherwise directed by your provider, please " contact [facility], our nurse care line that is available for 24/7 assistance.     Registered nurses in the [facility] Center provide clinical advisement, health education, appointment booking, and other advisory services.  Call for this free service at [phone].        Advance Directives     An advance directive is a document which, in the event you are no longer able to make decisions for yourself, tells your healthcare team what kind of treatment you do or do not want to receive, or who you would like to make those decisions for you.  If you do not currently have an advance directive, [facility] encourages you to create one.  For more information call:  [phone]), [phone] ([phone]),  or log on to [email or web address].        Language Assistance Services     ATTENTION: Language assistance services are available, free of charge. Please call [phone].      ATENCIÓN: Si habla español, tiene a reis disposición servicios gratuitos de asistencia lingüística. Llame al [phone].     CHÚ Ý: N?u b?n nói Ti?ng Vi?t, có các d?ch v? h? tr? ngôn ng? mi?n phí dành cho b?n. G?i s? [phone].         [facility] complies with applicable Federal civil rights laws and does not discriminate on the basis of race, color, national origin, age, disability, or sex.

## 2017-03-23 NOTE — INTERVAL H&P NOTE
The patient has been examined and the H&P has been reviewed:    I concur with the findings and no changes have occurred since H&P was written.    Anesthesia/Surgery risks, benefits and alternative options discussed and understood by patient/family.          Active Hospital Problems    Diagnosis  POA    Hidradenitis suppurativa of anus [L73.2]  Yes      Resolved Hospital Problems    Diagnosis Date Resolved POA   No resolved problems to display.

## 2017-03-23 NOTE — TRANSFER OF CARE
"Anesthesia Transfer of Care Note    Patient: Radha Flores    Procedure(s) Performed: Procedure(s) (LRB):  EXCISION-SKIN (N/A)    Patient location: PACU    Anesthesia Type: general    Transport from OR: Transported from OR on room air with adequate spontaneous ventilation    Post pain: adequate analgesia    Post assessment: no apparent anesthetic complications and tolerated procedure well    Post vital signs: stable    Level of consciousness: awake    Nausea/Vomiting: no nausea/vomiting    Complications: none          Last vitals:   Visit Vitals    BP (!) 172/108 (BP Location: Right arm, Patient Position: Sitting, BP Method: Automatic)    Pulse 70    Temp 36.6 °C (97.8 °F) (Oral)    Resp 16    Ht 5' 6" (1.676 m)    Wt 93.8 kg (206 lb 12.7 oz)    SpO2 100%    Breastfeeding No    BMI 33.38 kg/m2     "

## 2017-03-23 NOTE — BRIEF OP NOTE
[facility] -   Brief Operative Note     SUMMARY     Surgery Date: 3/23/2017     Surgeon(s) and Role:     * Peter Clement MD - Primary    Assisting Surgeon: None    Pre-op Diagnosis:  Hidradenitis suppurativa of anus [L73.2]    Post-op Diagnosis:  Post-Op Diagnosis Codes:     * Hidradenitis suppurativa of anus [L73.2]    Procedure(s) (LRB):  EXCISION-SKIN (N/A)    Anesthesia: General    Description of the findings of the procedure: excision left perianal hidradenitis    Findings/Key Components: left perianal hidradenitis    Estimated Blood Loss: 10 mL         Specimens:   Specimen (12h ago through future)    Start     Ordered    03/23/17 0938  Specimen to Pathology - Surgery  Once     Comments:  Left perineum hidradenitis    DX: Hidradenitis of left thigh    03/23/17 0939          Discharge Note    SUMMARY     Admit Date: 3/23/2017    Discharge Date and Time:  03/23/2017 10:01 AM    Hospital Course The patient underwent excision of hidradenitis and was discharged post op.    Final Diagnosis: Post-Op Diagnosis Codes:     * Hidradenitis suppurativa of anus [L73.2]    Disposition: Home or Self Care    Follow Up/Patient Instructions:     Medications:  Reconciled Home Medications:   Current Discharge Medication List      START taking these medications    Details   hydrocodone-acetaminophen 5-325mg (NORCO) 5-325 mg per tablet Take 1 tablet by mouth every 4 (four) hours as needed for Pain.  Qty: 20 tablet, Refills: 0         CONTINUE these medications which have NOT CHANGED    Details   amlodipine (NORVASC) 10 MG tablet Take 10 mg by mouth.      losartan (COZAAR) 100 MG tablet Take 100 mg by mouth.      sulfamethoxazole-trimethoprim 800-160mg (BACTRIM DS) 800-160 mg Tab Take 1 tablet by mouth 2 (two) times daily.  Qty: 20 tablet, Refills: 0    Associated Diagnoses: Perirectal abscess      clotrimazole-betamethasone (LOTRISONE) lotion Apply topically.      nebivolol (BYSTOLIC) 10 MG Tab Take 10 mg by mouth.       valacyclovir (VALTREX) 1000 MG tablet TAKE 1 TABLET BY MOUTH TWICE DAILY             Discharge Procedure Orders  Diet general     Activity as tolerated     Shower on day dressing removed (No bath)   Order Comments: 48 hours     Call MD for:  temperature >100.4     Call MD for:  persistent nausea and vomiting     Call MD for:  severe uncontrolled pain     Call MD for:  difficulty breathing, headache or visual disturbances     Call MD for:  redness, tenderness, or signs of infection (pain, swelling, redness, odor or green/yellow discharge around incision site)     Call MD for:  hives     Call MD for:  persistent dizziness or light-headedness     Call MD for:  extreme fatigue     No dressing needed     Other restrictions (specify):   Order Comments: Avoid sitting over area and direct pressure. Lying or standing ok. May need donut to sit on.       Follow-up Information     Follow up with Peter Clement MD In 2 weeks.    Specialty:  General Surgery    Contact information:    [address] [place] [address] DR Anupam CARDENAS [id number] [phone]

## 2017-03-23 NOTE — OP NOTE
[facility]  Surgery Department  Operative Note    SUMMARY     Date of Procedure: 3/23/2017     Procedure: Procedure(s) (LRB):  Excision of hidradenitis    Surgeon(s) and Role:     * Peter Clement MD - Primary    Assisting Surgeon: None    Pre-Operative Diagnosis: Hidradenitis suppurativa of anus [L73.2]    Post-Operative Diagnosis: Post-Op Diagnosis Codes:     * Hidradenitis suppurativa of anus [L73.2]    Anesthesia: General    Technical Procedures Used:  Excision of left perianal hidradenitis    Description of the Findings of the Procedure: left perianal hidradenitis    Complications: No    Estimated Blood Loss (EBL): 10 mL           Implants: * No implants in log *    Specimens:   Specimen (12h ago through future)    Start     Ordered    03/23/17 0938  Specimen to Pathology - Surgery  Once     Comments:  Left perineum hidradenitis    DX: Hidradenitis of left thigh    03/23/17 0939                  Condition: Good    Disposition: PACU - hemodynamically stable.    Procedure:  The patient was brought to the OR and underwent general anesthesia. She was prepped and draped in the usual sterile fashion. The tract of hidradenitis was excised with an elliptical incision bovie electrocautery used to excise the specimen. Hemostasis was achieved. The wound was closed in layers with 3-0 vicryl and 4-0 moncryl. Incision length was 10cm. Dermabond and sterile dressing was placed. The patient was transported to recovery in stable and satisfactory condition.

## 2017-03-23 NOTE — ANESTHESIA POSTPROCEDURE EVALUATION
"Anesthesia Post Evaluation    Patient: Radha Flores    Procedure(s) Performed: Procedure(s) (LRB):  EXCISION-SKIN (N/A)    Final Anesthesia Type: general  Patient location during evaluation: PACU  Patient participation: Yes- Able to Participate  Level of consciousness: awake and alert  Post-procedure vital signs: reviewed and stable  Pain management: adequate  Airway patency: patent    Anesthetic complications: no      Cardiovascular status: blood pressure returned to baseline  Respiratory status: unassisted  Hydration status: euvolemic  Follow-up not needed.        Visit Vitals    /88 (BP Location: Right arm, Patient Position: Lying, BP Method: Automatic)    Pulse 72    Temp 36.7 °C (98 °F) (Temporal)    Resp 12    Ht 5' 6" (1.676 m)    Wt 93.8 kg (206 lb 12.7 oz)    SpO2 100%    Breastfeeding No    BMI 33.38 kg/m2       Pain/Chapito Score: Pain Assessment Performed: Yes (3/23/2017 11:30 AM)  Presence of Pain: complains of pain/discomfort (3/23/2017 11:30 AM)  Pain Rating Prior to Med Admin: 7 (3/23/2017 10:56 AM)  Chapito Score: 10 (3/23/2017 11:30 AM)      "

## 2017-03-23 NOTE — H&P (VIEW-ONLY)
History & Physical    SUBJECTIVE:     History of Present Illness:  Patient is a 41 y.o. female referred for hidradenitis. The patient reports left inner thigh chronic abscess that swells and drains intermittently. Despite previous trials of antibiotics this has persisted for the past 10 months.     Chief Complaint   Patient presents with    Consult     Lumps on buttocks       Review of patient's allergies indicates:  No Known Allergies    Current Outpatient Prescriptions   Medication Sig Dispense Refill    amlodipine (NORVASC) 10 MG tablet Take 10 mg by mouth.      clotrimazole-betamethasone (LOTRISONE) lotion Apply topically.      losartan (COZAAR) 100 MG tablet Take 100 mg by mouth.      sulfamethoxazole-trimethoprim 800-160mg (BACTRIM DS) 800-160 mg Tab Take 1 tablet by mouth 2 (two) times daily. 20 tablet 0    valacyclovir (VALTREX) 1000 MG tablet TAKE 1 TABLET BY MOUTH TWICE DAILY      nebivolol (BYSTOLIC) 10 MG Tab Take 10 mg by mouth.       No current facility-administered medications for this visit.        Past Medical History:   Diagnosis Date    Abnormal Pap smear of cervix     Herpes simplex virus (HSV) infection     Hypertension      Past Surgical History:   Procedure Laterality Date    HYSTERECTOMY  2011    [facility] for hypermen and abnl paps     Family History   Problem Relation Age of Onset    Diabetes Maternal Grandmother     Diabetes Mother     Breast cancer Neg Hx     Colon cancer Neg Hx     Ovarian cancer Neg Hx      Social History   Substance Use Topics    Smoking status: Never Smoker    Smokeless tobacco: None    Alcohol use No        Review of Systems:  Review of Systems   Constitutional: Negative for chills and fever.   HENT: Negative for congestion.    Eyes: Negative for visual disturbance.   Respiratory: Negative for cough and shortness of breath.    Cardiovascular: Negative for chest pain, palpitations and leg swelling.   Gastrointestinal: Negative for abdominal distention,  abdominal pain, constipation, diarrhea, nausea and vomiting.   Endocrine: Negative for polyuria.   Genitourinary: Negative for dysuria.   Skin: Positive for wound. Negative for rash.   Neurological: Negative for dizziness and light-headedness.   Hematological: Negative for adenopathy.       OBJECTIVE:     Vital Signs (Most Recent)  Temp: 98.1 °F (36.7 °C) (03/20/17 1057)  Pulse: 72 (03/20/17 1057)  BP: (!) 144/90 (03/20/17 1057)     93.9 kg (207 lb 0.2 oz)     Physical Exam:  Physical Exam   Constitutional: She is oriented to person, place, and time. She appears well-developed and well-nourished.   HENT:   Head: Normocephalic and atraumatic.   Eyes: EOM are normal.   Neck: Neck supple.   Cardiovascular: Normal rate and regular rhythm.    Pulmonary/Chest: Effort normal and breath sounds normal.   Abdominal: Soft. Bowel sounds are normal. She exhibits no distension. There is no tenderness.   Musculoskeletal:   Left inner thigh hidradenitis   Neurological: She is alert and oriented to person, place, and time.   Skin: Skin is warm and dry.   Vitals reviewed.      ASSESSMENT/PLAN:     42 y/o female with hidradenitis of the left inner thigh     PLAN:Plan     -Excision hidradenitis 3/23/17  -Preop: cbc, bmp, ekg, UPT morning of surgery  -Risks and benefits discussed with patient including pain, bleeding, infection, delayed wound healing, recurrence

## 2017-03-23 NOTE — DISCHARGE INSTRUCTIONS
Incision Care  Remember: Follow-up visits allow your doctor to make sure your incision is healing well. Be sure to keep your appointments.   Stitches (sutures), surgical staples, special strips of surgical tape called Steri-Strips, or surgical skin glue may be used to close incisions. They also help stop bleeding and speed healing. To help your incision heal, follow the tips on this handout.  Home care     Steri-Strips   · Always wash your hands before touching your incision.  · Keep your incision clean and dry.  · Avoid doing things that could cause dirt or sweat to get on your incision.  · Dont pick at scabs. They help protect the wound.  · Keep your incision out of water.  · Take a sponge bath to avoid getting your incision wet, unless your healthcare provider tells you otherwise.  · Ask your provider when can you take a shower or bathe.  · Ask your provider about the best way to keep your incision dry when bathing or showering.  · Pat sutures dry if they get wet. Dont rub.  · Leave the bandage (dressing) in place until you are told to remove it or change it. Change it only as directed, using clean hands.  · After the first 12 hours, change your dressing every 24 hours, or as directed by your healthcare provider.  · Change your dressing if it gets wet or soiled.  Care for specific closures  Follow these guidelines unless your healthcare provider tells you otherwise:  · Sutures or staples. Once you no longer need to keep these dry, clean the wound daily. First remove the bandage using clean hands. Then wash the area gently with soap and warm water. Use a wet cotton swab to loosen and remove any blood or crust that forms. After cleaning, put a thin layer of antibiotic ointment on. Then put on a new bandage.  · Skin glue. Dont put liquid, ointment, or cream on your wound while the glue is in place. Avoid activities that cause heavy sweating. Protect the wound from sunlight. Do not scratch, rub, or pick at the  glue. Do not put tape directly over the glue. The glue should peel off within 5 to 10 days.  · Surgical tape. Keep the area dry. If it gets wet, blot the area dry with a clean towel. Surgical tape usually falls off within 7 to 10 days. If it has not fallen off after 10 days, contact your healthcare provider before taking it off yourself. If you are told to remove the tape, put mineral oil or petroleum jelly on a cotton ball. Gently rub the tape until it is removed.  Changing your dressing     Wash your hands before changing a dressing.    Leave the dressing (bandage) in place until you are told to remove it or change it. Follow the instructions below unless told otherwise by your healthcare provider.  · Always wash your hands before changing your dressing.  · After the first 48 hours the incision wound usually will have closed. At this point, leave the incision uncovered and open to the air. If the incision has not closed keep it covered.  · Cover your incision only if your clothing is rubbing it or causing irritation.  · Change your dressing if it gets wet or soiled.  Follow-up care  Follow up with your healthcare provider to ask how long sutures or staples should be left in place. Be sure to return for suture or staple removal as directed. If dissolving stitches were used in your mouth, these will not need to be removed. They should fall out or dissolve on their own.  If tape closures were used, remove them yourself when your provider recommends if they have not fallen off on their own. If skin glue was used, the glue will wear off by itself.  When to seek medical care  Call your healthcare provider if you have any of the following:  · More pain, redness, swelling, bleeding, or foul-smelling discharge around the incision area  · Fever of 100.4°F (38ºC) or higher or as directed by your healthcare provider  · Shaking chills  · Vomiting or nausea that doesn't go away  · Numbness, coldness, or tingling around the  incision area, or changes in skin color  · Opening of the sutures or wound  · Stitches or staples come apart or fall out or surgical tape falls off before 7 days or as directed by your healthcare provider   Date Last Reviewed: 10/16/2014  © 9205-5303 [employer]. [address] [place] [address]. All rights reserved. This information is not intended as a substitute for professional medical care. Always follow your healthcare professional's instructions.    General Information:    1. Do not drink alcoholic beverages including beer for 24 hours or as long as you are on pain medication..  2. Do not drive a motor vehicle, operate machinery or power tools, or signs legal papers for 24 hours or as long as you are on pain medication.   3. You may experience light-headedness, dizziness, and sleepiness following surgery. Please do not stay alone. A responsible adult should be with you for this 24 hour period.  4. Go home and rest.  5. Progress slowly to a normal diet unless instructed.  Otherwise, begin with liquids such as soft drinks, then soup and crackers working up to solid foods. Drink plenty of nonalcoholic fluids.  6. Certain anesthetics and pain medications produce nausea and vomiting in certain individuals. If nausea becomes a problem at home, call you doctor.  7. A nurse will be calling you sometime after surgery. Do not be alarmed. This is our way of finding out how you are doing.  8. Several times every hour while you are awake, take 2-3 deep breaths and cough. If you had stomach surgery hold a pillow or rolled towel firmly against your stomach before you cough. This will help with any pain the cough might cause.  9. Several times every hour while you are awake, pump and flex your feet 5-6 times and do foot circles. This will help prevent blood clots.  10. Call your doctor for severe pain, bleeding, fever, or signs or symptoms of infection (pain, swelling, redness, foul odor,  drainage).  11. You can contact your doctor anytime by [phone] for the [facility] (at [facility]) or [phone] for the [facility].   [email or web address] is another way to contact your doctor if you are an active participant online with [facility].      Acetaminophen; Hydrocodone tablets or capsules  What is this medicine?  ACETAMINOPHEN; HYDROCODONE (a set a FELI jemal fen; sallie droe KOE done) is a pain reliever. It is used to treat moderate to severe pain.  How should I use this medicine?  Take this medicine by mouth with a glass of water. Follow the directions on the prescription label. You can take it with or without food. If it upsets your stomach, take it with food. Do not take your medicine more often than directed.  A special MedGuide will be given to you by the pharmacist with each prescription and refill. Be sure to read this information carefully each time.  Talk to your pediatrician regarding the use of this medicine in children. Special care may be needed.  What side effects may I notice from receiving this medicine?  Side effects that you should report to your doctor or health care professional as soon as possible:  · allergic reactions like skin rash, itching or hives, swelling of the face, lips, or tongue  · breathing problems  · confusion  · redness, blistering, peeling or loosening of the skin, including inside the mouth  · signs and symptoms of low blood pressure like dizziness; feeling faint or lightheaded, falls; unusually weak or tired  · trouble passing urine or change in the amount of urine  · yellowing of the eyes or skin  Side effects that usually do not require medical attention (report to your doctor or health care professional if they continue or are bothersome):  · constipation  · dry mouth  · nausea, vomiting  · tiredness  What may interact with this medicine?  This medicine may interact with the following medications:  · alcohol  · antiviral  medicines for HIV or AIDS  · atropine  · antihistamines for allergy, cough and cold  · certain antibiotics like erythromycin, clarithromycin  · certain medicines for anxiety or sleep  · certain medicines for bladder problems like oxybutynin, tolterodine  · certain medicines for depression like amitriptyline, fluoxetine, sertraline  · certain medicines for fungal infections like ketoconazole and itraconazole  · certain medicines for Parkinson's disease like benztropine, trihexyphenidyl  · certain medicines for seizures like carbamazepine, phenobarbital, phenytoin, primidone  · certain medicines for stomach problems like dicyclomine, hyoscyamine  · certain medicines for travel sickness like scopolamine  · general anesthetics like halothane, isoflurane, methoxyflurane, propofol  · ipratropium  · local anesthetics like lidocaine, pramoxine, tetracaine  · MAOIs like Carbex, Eldepryl, Marplan, Nardil, and Parnate  · medicines that relax muscles for surgery  · other medicines with acetaminophen  · other narcotic medicines for pain or cough  · phenothiazines like chlorpromazine, mesoridazine, prochlorperazine, thioridazine  · rifampin  What if I miss a dose?  If you miss a dose, take it as soon as you can. If it is almost time for your next dose, take only that dose. Do not take double or extra doses.  Where should I keep my medicine?  Keep out of the reach of children. This medicine can be abused. Keep your medicine in a safe place to protect it from theft. Do not share this medicine with anyone. Selling or giving away this medicine is dangerous and against the law.  This medicine may cause accidental overdose and death if it taken by other adults, children, or pets. Mix any unused medicine with a substance like cat litter or coffee grounds. Then throw the medicine away in a sealed container like a sealed bag or a coffee can with a lid. Do not use the medicine after the expiration date.  Store at room temperature between  15 and 30 degrees C (59 and 86 degrees F).  What should I tell my health care provider before I take this medicine?  They need to know if you have any of these conditions:  · brain tumor  · Crohn's disease, inflammatory bowel disease, or ulcerative colitis  · drug abuse or addiction  · head injury  · heart or circulation problems  · if you often drink alcohol  · kidney disease or problems going to the bathroom  · liver disease  · lung disease, asthma, or breathing problems  · an unusual or allergic reaction to acetaminophen, hydrocodone, other opioid analgesics, other medicines, foods, dyes, or preservatives  · pregnant or trying to get pregnant  · breast-feeding  What should I watch for while using this medicine?  Tell your doctor or health care professional if your pain does not go away, if it gets worse, or if you have new or a different type of pain. You may develop tolerance to the medicine. Tolerance means that you will need a higher dose of the medicine for pain relief. Tolerance is normal and is expected if you take the medicine for a long time.  Do not suddenly stop taking your medicine because you may develop a severe reaction. Your body becomes used to the medicine. This does NOT mean you are addicted. Addiction is a behavior related to getting and using a drug for a non-medical reason. If you have pain, you have a medical reason to take pain medicine. Your doctor will tell you how much medicine to take. If your doctor wants you to stop the medicine, the dose will be slowly lowered over time to avoid any side effects.  There are different types of narcotic medicines (opiates). If you take more than one type at the same time or if you are taking another medicine that also causes drowsiness, you may have more side effects. Give your health care provider a list of all medicines you use. Your doctor will tell you how much medicine to take. Do not take more medicine than directed. Call emergency for help if  you have problems breathing or unusual sleepiness.  Do not take other medicines that contain acetaminophen with this medicine. Always read labels carefully. If you have questions, ask your doctor or pharmacist.  If you take too much acetaminophen get medical help right away. Too much acetaminophen can be very dangerous and cause liver damage. Even if you do not have symptoms, it is important to get help right away.  You may get drowsy or dizzy. Do not drive, use machinery, or do anything that needs mental alertness until you know how this medicine affects you. Do not stand or sit up quickly, especially if you are an older patient. This reduces the risk of dizzy or fainting spells. Alcohol may interfere with the effect of this medicine. Avoid alcoholic drinks.  The medicine will cause constipation. Try to have a bowel movement at least every 2 to 3 days. If you do not have a bowel movement for 3 days, call your doctor or health care professional.  Your mouth may get dry. Chewing sugarless gum or sucking hard candy, and drinking plenty of water may help. Contact your doctor if the problem does not go away or is severe.  Date Last Reviewed:   NOTE:This sheet is a summary. It may not cover all possible information. If you have questions about this medicine, talk to your doctor, pharmacist, or health care provider. Copyright© 2016 [employer]

## 2017-03-27 ENCOUNTER — TELEPHONE (OUTPATIENT)
Dept: SURGERY | Facility: CLINIC | Age: 42
End: 2017-03-27

## 2017-03-27 NOTE — TELEPHONE ENCOUNTER
----- Message from Bhaskar Zuleta sent at 3/27/2017 11:57 AM CDT -----  Contact: [phone]   Pt would like to consult  with the nurse about discharge,pt would like to know if it's normal.  Please call back @ [phone].  Thanks-[facility]

## 2017-03-27 NOTE — TELEPHONE ENCOUNTER
Spoke with Dr. Clement, drainage is normal, but should become less, patient notified and voiced understandings

## 2017-04-01 ENCOUNTER — OFFICE VISIT (OUTPATIENT)
Dept: URGENT CARE | Facility: CLINIC | Age: 42
End: 2017-04-01
Payer: COMMERCIAL

## 2017-04-01 ENCOUNTER — NURSE TRIAGE (OUTPATIENT)
Dept: ADMINISTRATIVE | Facility: CLINIC | Age: 42
End: 2017-04-01

## 2017-04-01 VITALS
BODY MASS INDEX: 33.24 KG/M2 | DIASTOLIC BLOOD PRESSURE: 84 MMHG | HEIGHT: 66 IN | TEMPERATURE: 99 F | WEIGHT: 206.81 LBS | SYSTOLIC BLOOD PRESSURE: 118 MMHG

## 2017-04-01 DIAGNOSIS — T81.31XA DEHISCENCE OF INCISION, INITIAL ENCOUNTER: Primary | ICD-10-CM

## 2017-04-01 PROCEDURE — 1160F RVW MEDS BY RX/DR IN RCRD: CPT | Mod: S$GLB,,, | Performed by: NURSE PRACTITIONER

## 2017-04-01 PROCEDURE — 87070 CULTURE OTHR SPECIMN AEROBIC: CPT

## 2017-04-01 PROCEDURE — 99999 PR PBB SHADOW E&M-EST. PATIENT-LVL IV: CPT | Mod: PBBFAC,,, | Performed by: NURSE PRACTITIONER

## 2017-04-01 PROCEDURE — 99214 OFFICE O/P EST MOD 30 MIN: CPT | Mod: S$GLB,,, | Performed by: NURSE PRACTITIONER

## 2017-04-01 RX ORDER — SULFAMETHOXAZOLE AND TRIMETHOPRIM 800; 160 MG/1; MG/1
1 TABLET ORAL 2 TIMES DAILY
Qty: 14 TABLET | Refills: 0 | Status: SHIPPED | OUTPATIENT
Start: 2017-04-01 | End: 2017-04-08

## 2017-04-01 RX ORDER — MUPIROCIN 20 MG/G
OINTMENT TOPICAL 2 TIMES DAILY
Qty: 1 TUBE | Refills: 0 | Status: SHIPPED | OUTPATIENT
Start: 2017-04-01 | End: 2017-04-15

## 2017-04-01 NOTE — PROGRESS NOTES
Subjective:       Patient ID: Radha Flores is a 41 y.o. female.    Chief Complaint: Drainage from Incision (surgery on 03/20/2017)    HPI Comments: Patient presents for wound check. Reported an opening to surgery site on today.  Had excision of left perianal hidradenitis on 3/20/17 by Dr. Clement.  Reports increase in pain.  Reports only having dry blood as drainage.       Wound Check   She was originally treated yesterday. Previous treatment included I&D of abscess. Her temperature was unmeasured prior to arrival. There has been no drainage from the wound. There is no redness present. The swelling has not changed. The pain has worsened. She has no difficulty moving the affected extremity or digit.     Review of Systems   Constitutional: Negative for chills and fever.   Eyes: Negative for discharge and redness.   Respiratory: Negative for cough.    Gastrointestinal: Negative for diarrhea, nausea and vomiting.   Genitourinary: Negative for dysuria.   Musculoskeletal: Positive for gait problem (due to recent surgery). Negative for back pain.   Skin: Positive for wound.   Allergic/Immunologic: Negative.    Neurological: Negative for dizziness and headaches.   Psychiatric/Behavioral: Negative for agitation and confusion.       Objective:      Physical Exam   Constitutional: She is oriented to person, place, and time. Vital signs are normal. She appears well-developed and well-nourished.   HENT:   Head: Normocephalic and atraumatic.   Neck: Normal range of motion.   Cardiovascular: Normal rate.    Pulmonary/Chest: Effort normal.   Musculoskeletal: Normal range of motion.   Neurological: She is alert and oriented to person, place, and time.   Skin: Skin is warm and dry.        Surgical incision noted to left inner thigh/buttock.  Small opening noted (1.5 cm x 1.0 cm x 0.3 cm).  No drainage or odor.  Tender to palpation.     Psychiatric: She has a normal mood and affect. Her behavior is normal.   Nursing note and  vitals reviewed.                    Spoke with surgeon on call, Dr. Jeansonne, and notified of change to incision site.  Informed of treatment plan and he agreed.  Patient will follow up on Monday or sooner if needed.  Assessment:       1. Dehiscence of incision, initial encounter        Plan:         Dehiscence of incision, initial encounter  Comments:  Keep area clean and dry.  Apply small amount of ointment to site.  and cover with small gauze.  If symptoms worsen, go to ER.   Orders:  -     CULTURE, AEROBIC  (SPECIFY SOURCE)  -     sulfamethoxazole-trimethoprim 800-160mg (BACTRIM DS) 800-160 mg Tab; Take 1 tablet by mouth 2 (two) times daily.  Dispense: 14 tablet; Refill: 0  -     mupirocin (BACTROBAN) 2 % ointment; Apply topically 2 (two) times daily. Apply to affected area three times a day.  Dispense: 1 Tube; Refill: 0  -     Cancel: CULTURE, AEROBIC  (SPECIFY SOURCE)      Instructed to take all medications as ordered.  Informed if no improvement or symptoms worsens to follow up with primary care physician.  Informed that I will notify her of culture reports.  Will follow up with surgeon on Monday 4/3/17.  Printed and review after visit summary with patient.

## 2017-04-01 NOTE — TELEPHONE ENCOUNTER
EC/Patient advised per OOC protocol verbalized understanding, agreed with recommendations to seek medical care at the nearest/local [facility] of choice for medical evaluation/treatment/intervention of current symptoms; EC/Patient had no further questions at end of call.

## 2017-04-01 NOTE — PATIENT INSTRUCTIONS
Preventing Surgical Site Infections  One risk of having surgery is an infection at the surgical site. The surgical site is any cut the surgeon makes in the skin to do the operation. Surgical site infections can range from minor to severe or even fatal. This sheet tells you more about surgical site infections, what hospitals are doing to prevent them, and how they are treated if they do occur. It also tells you what you can do to prevent these infections.  What causes surgical site infections?     Covering a wound with a sterile dressing helps prevent infection.   Germs are everywhere. Theyre on your skin, in the air, and on things you touch. Many germs are good. Some are harmful. Surgical site infections occur when harmful germs enter your body through the incision in your skin. Some infections are caused by germs that are in the air or on objects. But most are caused by germs found on and in your own body.  Who is at risk for surgical site infections?  Anyone can have a surgical site infection. Your risk is greater if you:  · Are an older adult  · Have a weakened immune system or other health conditions or illnesses such as diabetes  · Are a smoker  · Have certain types of operations, such as abdominal surgery  · Don't eat enough healthy foods (malnourished)  · Are very overweight  What are the symptoms of a surgical site infection?  · The infection usually begins with increased skin redness, pain, and swelling around the incision. Later you may notice a cloudy or greenish-yellow discharge from the incision. The incision may separate or open up. You are also likely to have a fever and may feel very ill.  · Symptoms can appear any time from hours to weeks after surgery. Implants such as an artificial knee or hip can become infected at any time after the operation.  How are surgical site infections treated?  · Several infections are treated with antibiotics. The type of medicine you get will depend on the germ  causing the infection. Most serious wound infections need surgery.  · An infected skin wound may be reopened and cleaned. Sometimes, deep wounds need to be packed with gauze that is changed often until the wound begins to heal from the inside out. Your healthcare provider will figure out the best care needed to treat your surgical site infection.  · If an infection occurs where an implant is placed, the implant may be removed.  · If you have an infection deeper in your body, you may need another operation to treat it.  What hospitals do to prevent surgical site infections  Many hospitals take these steps to help prevent surgical site infections:  · Handwashing. Before the operation, your surgeon and all operating room staff scrub their hands and arms with an antiseptic soap.  · Clean skin. The site where your incision is made is carefully cleaned with an antiseptic solution.  · Sterile clothing and drapes. Members of your surgical team wear medical uniforms (scrub suits), long-sleeved surgical gowns, masks, caps, shoe covers, and sterile gloves. Your body is fully covered with a large sterile sheet (sterile drape) except for the spot where the incision is made.  · Clean air. Operating rooms have special air filters and positive pressure airflow to prevent unfiltered air from entering the room.  · Careful use of antibiotics. Antibiotics are given no more than 60 minutes before the incision is made and stopped within 24 hours after surgery. This helps kill germs but avoids problems that can occur when antibiotics are taken longer.  · Controlled blood sugar levels. Your blood sugar level may rise because of the stress of the surgery. Your blood sugar level is watched closely to make sure it stays within a normal range. High blood sugar delays wound healing and increases the chances for infection.  · Controlled body temperature. A lower-than-normal temperature during or after surgery prevents oxygen from reaching the  wound and makes it harder for your body to fight infection. Hospitals may warm IV fluids, increase the temperature in the operating room, and provide warm-air blankets.  · Proper hair removal. Any hair that must be removed is clipped, not shaved with a razor. This prevents tiny nicks and cuts through which germs can enter.  · Wound care. After surgery, a closed wound is covered with a sterile dressing for a day or two. Open wounds are packed with sterile gauze and covered with a sterile dressing.  What you can do to prevent surgical site infections  · Ask questions. Learn what your hospital is doing to prevent infection.  · If your doctor tells you to, shower or bathe with antiseptic soap the night before and the day of your operation. Follow the instructions you are given. You may be asked to use a special antibiotic cleanser that you dont rinse off.  · If you smoke, stop or cut down. Ask your doctor about ways to quit.  · Take antibiotics only when your healthcare provider tells you to. Using antibiotics when theyre not needed can create germs that are harder to kill. Also, finish all your antibiotics, even if you feel better.  · Be sure healthcare workers clean their hands with soap and water or with an alcohol-based hand  before and after caring for you. Dont be afraid to remind them.  · After surgery, eat healthy foods. Care for your incision as directed by your doctor or nurse.     When to seek medical care  Call your healthcare provider if you have any of the following:  · Increased soreness, pain, or tenderness at the surgical site  · A red streak, increased redness, or puffiness near the incision  · Yellowish, cloudy, or bad-smelling discharge from the incision  · Stitches that dissolve before the wound heals  · Fever of 100.4°F (38°C) or higher, or as directed by your healthcare provider  · A tired feeling that doesnt go away   Date Last Reviewed: 10/22/2014  © 7682-0782 [employer] [address] [place] [address]. All rights reserved. This information is not intended as a substitute for professional medical care. Always follow your healthcare professional's instructions.

## 2017-04-01 NOTE — MR AVS SNAPSHOT
[facility]  [address] [place] [address]  Phone: [phone]  Fax: [phone]                  Radha Flores   2017 2:10 PM   Office Visit    Description:  Female : 1975   Provider:  Jemima Paredes NP   Department:  [facility]           Reason for Visit     Drainage from Incision           Diagnoses this Visit        Comments    Dehiscence of incision, initial encounter    -  Primary Keep area clean and dry.  Apply small amount of ointment to site.  and cover with small gauze.  If symptoms worsen, go to ER.            To Do List           Future Appointments        Provider Department Dept Phone    4/3/2017 10:40 AM Peter Clement MD [facility] [phone]      Goals (5 Years of Data)     None       These Medications        Disp Refills Start End    sulfamethoxazole-trimethoprim 800-160mg (BACTRIM DS) 800-160 mg Tab 14 tablet 0 2017    Take 1 tablet by mouth 2 (two) times daily. - Oral    Pharmacy: [facility] - [address] Ph #: [phone]       mupirocin (BACTROBAN) 2 % ointment 1 Tube 0 2017 4/15/2017    Apply topically 2 (two) times daily. Apply to affected area three times a day. - Topical (Top)    Pharmacy: [facility] - [address] Ph #: [phone]         [facility]     [facility] Care [facility] - 24 Assistance  Unless otherwise directed by your provider, please contact [facility], our nurse care line that is available for 24/ assistance.     Registered nurses in the [facility] provide: appointment scheduling, clinical advisement, health education, and other advisory services.  Call: [phone] (toll free)               Medications           Message regarding Medications     Verify the changes and/or additions to your medication regime listed below are the same as discussed with your clinician today.  If  "any of these changes or additions are incorrect, please notify your healthcare provider.        START taking these NEW medications        Refills    sulfamethoxazole-trimethoprim 800-160mg (BACTRIM DS) 800-160 mg Tab 0    Sig: Take 1 tablet by mouth 2 (two) times daily.    Class: Normal    Route: Oral    mupirocin (BACTROBAN) 2 % ointment 0    Sig: Apply topically 2 (two) times daily. Apply to affected area three times a day.    Class: Normal    Route: Topical (Top)           Verify that the below list of medications is an accurate representation of the medications you are currently taking.  If none reported, the list may be blank. If incorrect, please contact your healthcare provider. Carry this list with you in case of emergency.           Current Medications     amlodipine (NORVASC) 10 MG tablet Take 10 mg by mouth.    clotrimazole-betamethasone (LOTRISONE) lotion Apply topically.    hydrocodone-acetaminophen 5-325mg (NORCO) 5-325 mg per tablet Take 1 tablet by mouth every 4 (four) hours as needed for Pain.    losartan (COZAAR) 100 MG tablet Take 100 mg by mouth.    mupirocin (BACTROBAN) 2 % ointment Apply topically 2 (two) times daily. Apply to affected area three times a day.    nebivolol (BYSTOLIC) 10 MG Tab Take 10 mg by mouth.    sulfamethoxazole-trimethoprim 800-160mg (BACTRIM DS) 800-160 mg Tab Take 1 tablet by mouth 2 (two) times daily.    valacyclovir (VALTREX) 1000 MG tablet TAKE 1 TABLET BY MOUTH TWICE DAILY           Clinical Reference Information           Your Vitals Were     BP Temp Height Weight BMI    118/84 (BP Location: Left arm, Patient Position: Sitting) 98.7 °F (37.1 °C) (Tympanic) 5' 6" (1.676 m) 93.8 kg (206 lb 12.7 oz) 33.38 kg/m2      Blood Pressure          Most Recent Value    BP  118/84      Allergies as of 4/1/2017     No Known Allergies      Immunizations Administered on Date of Encounter - 4/1/2017     None      Orders Placed During Today's Visit      Normal Orders This Visit    " CULTURE, AEROBIC  (SPECIFY SOURCE)       Instructions      Preventing Surgical Site Infections  One risk of having surgery is an infection at the surgical site. The surgical site is any cut the surgeon makes in the skin to do the operation. Surgical site infections can range from minor to severe or even fatal. This sheet tells you more about surgical site infections, what hospitals are doing to prevent them, and how they are treated if they do occur. It also tells you what you can do to prevent these infections.  What causes surgical site infections?     Covering a wound with a sterile dressing helps prevent infection.   Germs are everywhere. Theyre on your skin, in the air, and on things you touch. Many germs are good. Some are harmful. Surgical site infections occur when harmful germs enter your body through the incision in your skin. Some infections are caused by germs that are in the air or on objects. But most are caused by germs found on and in your own body.  Who is at risk for surgical site infections?  Anyone can have a surgical site infection. Your risk is greater if you:  · Are an older adult  · Have a weakened immune system or other health conditions or illnesses such as diabetes  · Are a smoker  · Have certain types of operations, such as abdominal surgery  · Don't eat enough healthy foods (malnourished)  · Are very overweight  What are the symptoms of a surgical site infection?  · The infection usually begins with increased skin redness, pain, and swelling around the incision. Later you may notice a cloudy or greenish-yellow discharge from the incision. The incision may separate or open up. You are also likely to have a fever and may feel very ill.  · Symptoms can appear any time from hours to weeks after surgery. Implants such as an artificial knee or hip can become infected at any time after the operation.  How are surgical site infections treated?  · Several infections are treated with antibiotics.  The type of medicine you get will depend on the germ causing the infection. Most serious wound infections need surgery.  · An infected skin wound may be reopened and cleaned. Sometimes, deep wounds need to be packed with gauze that is changed often until the wound begins to heal from the inside out. Your healthcare provider will figure out the best care needed to treat your surgical site infection.  · If an infection occurs where an implant is placed, the implant may be removed.  · If you have an infection deeper in your body, you may need another operation to treat it.  What hospitals do to prevent surgical site infections  Many hospitals take these steps to help prevent surgical site infections:  · Handwashing. Before the operation, your surgeon and all operating room staff scrub their hands and arms with an antiseptic soap.  · Clean skin. The site where your incision is made is carefully cleaned with an antiseptic solution.  · Sterile clothing and drapes. Members of your surgical team wear medical uniforms (scrub suits), long-sleeved surgical gowns, masks, caps, shoe covers, and sterile gloves. Your body is fully covered with a large sterile sheet (sterile drape) except for the spot where the incision is made.  · Clean air. Operating rooms have special air filters and positive pressure airflow to prevent unfiltered air from entering the room.  · Careful use of antibiotics. Antibiotics are given no more than 60 minutes before the incision is made and stopped within 24 hours after surgery. This helps kill germs but avoids problems that can occur when antibiotics are taken longer.  · Controlled blood sugar levels. Your blood sugar level may rise because of the stress of the surgery. Your blood sugar level is watched closely to make sure it stays within a normal range. High blood sugar delays wound healing and increases the chances for infection.  · Controlled body temperature. A lower-than-normal temperature during  or after surgery prevents oxygen from reaching the wound and makes it harder for your body to fight infection. Hospitals may warm IV fluids, increase the temperature in the operating room, and provide warm-air blankets.  · Proper hair removal. Any hair that must be removed is clipped, not shaved with a razor. This prevents tiny nicks and cuts through which germs can enter.  · Wound care. After surgery, a closed wound is covered with a sterile dressing for a day or two. Open wounds are packed with sterile gauze and covered with a sterile dressing.  What you can do to prevent surgical site infections  · Ask questions. Learn what your hospital is doing to prevent infection.  · If your doctor tells you to, shower or bathe with antiseptic soap the night before and the day of your operation. Follow the instructions you are given. You may be asked to use a special antibiotic cleanser that you dont rinse off.  · If you smoke, stop or cut down. Ask your doctor about ways to quit.  · Take antibiotics only when your healthcare provider tells you to. Using antibiotics when theyre not needed can create germs that are harder to kill. Also, finish all your antibiotics, even if you feel better.  · Be sure healthcare workers clean their hands with soap and water or with an alcohol-based hand  before and after caring for you. Dont be afraid to remind them.  · After surgery, eat healthy foods. Care for your incision as directed by your doctor or nurse.     When to seek medical care  Call your healthcare provider if you have any of the following:  · Increased soreness, pain, or tenderness at the surgical site  · A red streak, increased redness, or puffiness near the incision  · Yellowish, cloudy, or bad-smelling discharge from the incision  · Stitches that dissolve before the wound heals  · Fever of 100.4°F (38°C) or higher, or as directed by your healthcare provider  · A tired feeling that doesnt go away   Date Last  Reviewed: 10/22/2014  © 7843-4856 [employer]. [address] [place] [address], [place] [address]. All rights reserved. This information is not intended as a substitute for professional medical care. Always follow your healthcare professional's instructions.             Language Assistance Services     ATTENTION: Language assistance services are available, free of charge. Please call [phone].      ATENCIÓN: Si habla español, tiene a reis disposición servicios gratuitos de asistencia lingüística. Llame al [phone].     CHÚ Ý: N?u b?n nói Ti?ng Vi?t, có các d?ch v? h? tr? ngôn ng? mi?n phí dành cho b?n. G?i s? [phone].         [facility] complies with applicable Federal civil rights laws and does not discriminate on the basis of race, color, national origin, age, disability, or sex.

## 2017-04-01 NOTE — TELEPHONE ENCOUNTER
"  Reason for Disposition   [1] Incision looks infected (spreading redness, pain) AND [2] large red area (> 2 in. or 5 cm)     Patient could not visualize site and assessment continue patient did not believed there was signs of infection however EC (took picture per patient's request) patient reviewed picture and of <1 inch opening at [distal] "end of incision towards anus patient states she there is is "redden which appears infected. Patient states top half of incision is "healing"    Additional Information   Negative: [1] Suture came out early AND [2] wound gaping AND [3] < 48 hours since sutures placed     No sutures (internal disposal)   Negative: [1] Bleeding from incision AND [2] won't stop after 10 minutes of direct pressure     Patient describes as "leakage" but "all the time"    Answer Assessment - Initial Assessment Questions  1. SYMPTOM: "What's the main symptom you're concerned about?" (e.g., redness, pain, drainage)      Small open area to rectal incision size of "pencil eraser"   2. ONSET: "When did ________  start?"      This morning   3. SURGERY: "What surgery was performed?"     EPIC Copied/Pasted Note--Hidradenitis suppurativa of anus  4. DATE of SURGERY: "When was surgery performed?"       3/20/2017  5. INCISION SITE: "Where is the incision located?"       Rectal/Anus   6. REDNESS: "Is there any redness at the incision site?" If yes, ask: "How wide across is the redness?" (Inches, centimeters)      Patient states she looked at incision in mirror an saw opening and drainage    "dark red old-like drainage" leaking cover yovana linen     7. PAIN: "Is there any pain?" If so, ask: "How bad is it?"  (Scale 1-10; or mild, moderate, severe)      Moderate   8. BLEEDING: "Is there any bleeding?" If so, ask: "How much?" and "Where?"      Not active   9. DRAINAGE: "Is there any drainage from the incision site?" If yes, ask: "What color and how much?" (e.g., red, cloudy, pus; drops, teaspoon)      "Dark red " "old-like drainage"  10. FEVER: "Do you have a fever?" If so, ask: "What is your temperature, how was it measured, and when did it start?"        No   11. OTHER SYMPTOMS: "Do you have any other symptoms?" (e.g., shaking chills, weakness, rash elsewhere on body)        No    Protocols used: ST POST-OP INCISION SYMPTOMS-A-AH    "

## 2017-04-03 ENCOUNTER — OFFICE VISIT (OUTPATIENT)
Dept: SURGERY | Facility: CLINIC | Age: 42
End: 2017-04-03
Payer: COMMERCIAL

## 2017-04-03 VITALS
SYSTOLIC BLOOD PRESSURE: 133 MMHG | DIASTOLIC BLOOD PRESSURE: 88 MMHG | WEIGHT: 207.69 LBS | BODY MASS INDEX: 33.52 KG/M2 | TEMPERATURE: 99 F | HEART RATE: 89 BPM

## 2017-04-03 DIAGNOSIS — L73.2 HIDRADENITIS SUPPURATIVA OF ANUS: Primary | ICD-10-CM

## 2017-04-03 PROCEDURE — 99999 PR PBB SHADOW E&M-EST. PATIENT-LVL III: CPT | Mod: PBBFAC,,, | Performed by: SURGERY

## 2017-04-03 PROCEDURE — 99024 POSTOP FOLLOW-UP VISIT: CPT | Mod: S$GLB,,, | Performed by: SURGERY

## 2017-04-03 NOTE — PROGRESS NOTES
Subjective:       Patient ID: Radha Flores is a 41 y.o. female.    Chief Complaint: Follow-up (Post-op)    HPI   S/p excision of left perineal hidradenitis. She is doing well. She does report the lower edge of her incision opened up but no significant drainage.  Review of Systems    Objective:      Physical Exam   Constitutional: She appears well-developed and well-nourished. No distress.   Genitourinary:   Genitourinary Comments: Left perineal incision, open at bottom with goo granulation tissue, no signs of infection   Vitals reviewed.      FINAL PATHOLOGIC DIAGNOSIS  1  Left perineal hidradenitis.  Skin with deep dermal acute and chronic inflammation and periadnexal inflammation consistent with hidradenitis.  See note.  Note: This resection also contains isolated fragments of intradermal squamous epithelium and acellular keratin; a  ruptured epidermal inclusion cyst cannot be excluded.  Assessment:    s/p excision of hidradenitis  Plan:       -keep wound dry and clean  -f/u in 2 weeks for wound check, may apply silver nitrate at that time depending on how well wound is healing

## 2017-04-03 NOTE — MR AVS SNAPSHOT
[facility]  [address] [place] [address]  Phone: [phone]  Fax: [phone]                  Radha Flores   4/3/2017 10:40 AM   Office Visit    Description:  Female : 1975   Provider:  Peter Clement MD   Department:  [facility]           Reason for Visit     Follow-up                To Do List           Future Appointments        Provider Department Dept Phone    4/3/2017 10:40 AM Peter Clement MD [facility] [phone]    2017 1:00 PM Karly Meza PA-C [facility] [phone]      Goals (5 Years of Data)     None      [facility]     [facility] Nurse Care Line -  Assistance  Unless otherwise directed by your provider, please contact [facility], our nurse care line that is available for  assistance.     Registered nurses in the [facility] On Call Center provide: appointment scheduling, clinical advisement, health education, and other advisory services.  Call: [phone] (toll free)               Medications           Message regarding Medications     Verify the changes and/or additions to your medication regime listed below are the same as discussed with your clinician today.  If any of these changes or additions are incorrect, please notify your healthcare provider.             Verify that the below list of medications is an accurate representation of the medications you are currently taking.  If none reported, the list may be blank. If incorrect, please contact your healthcare provider. Carry this list with you in case of emergency.           Current Medications     amlodipine (NORVASC) 10 MG tablet Take 10 mg by mouth.    clotrimazole-betamethasone (LOTRISONE) lotion Apply topically.    hydrocodone-acetaminophen 5-325mg (NORCO) 5-325 mg per tablet Take 1 tablet by mouth every 4 (four) hours as needed for Pain.    losartan (COZAAR) 100 MG tablet Take 100 mg by mouth.    mupirocin (BACTROBAN) 2  % ointment Apply topically 2 (two) times daily. Apply to affected area three times a day.    nebivolol (BYSTOLIC) 10 MG Tab Take 10 mg by mouth.    sulfamethoxazole-trimethoprim 800-160mg (BACTRIM DS) 800-160 mg Tab Take 1 tablet by mouth 2 (two) times daily.    valacyclovir (VALTREX) 1000 MG tablet TAKE 1 TABLET BY MOUTH TWICE DAILY           Clinical Reference Information           Your Vitals Were     BP Pulse Temp Weight BMI    133/88 (BP Location: Right arm, Patient Position: Sitting) 89 98.7 °F (37.1 °C) (Oral) 94.2 kg (207 lb 10.8 oz) 33.52 kg/m2      Blood Pressure          Most Recent Value    BP  133/88      Allergies as of 4/3/2017     No Known Allergies      Immunizations Administered on Date of Encounter - 4/3/2017     None      Language Assistance Services     ATTENTION: Language assistance services are available, free of charge. Please call [phone].      ATENCIÓN: Si habla nawaf, tiene a reis disposición servicios gratuitos de asistencia lingüística. Llame al [phone].     [facility] Ý: N?u b?n nói Ti?ng Vi?t, có các d?ch v? h? tr? ngôn ng? mi?n phí lucinah cho b?n. G?i s? [phone].         [facility] complies with applicable Federal civil rights laws and does not discriminate on the basis of race, color, national origin, age, disability, or sex.

## 2017-04-04 LAB — BACTERIA SPEC AEROBE CULT: NORMAL

## 2017-04-06 ENCOUNTER — TELEPHONE (OUTPATIENT)
Dept: SURGERY | Facility: CLINIC | Age: 42
End: 2017-04-06

## 2017-04-06 NOTE — TELEPHONE ENCOUNTER
Spoke with Dr Clement via [employer]. He wants patient to continue to pack wound with gauze. Advised patient and understanding verbalized.

## 2017-04-06 NOTE — TELEPHONE ENCOUNTER
----- Message from Johanna Gomez sent at 4/6/2017  8:44 AM CDT -----  Contact: pt  Pt states incision is widen since Monday when she came in.Pt has concerns.....[phone] (home)

## 2017-04-06 NOTE — TELEPHONE ENCOUNTER
Returned call to patient. Patient is concerned that her incision has opened up more than it was when she was seen in clinic on Monday. Advised patient that Dr Clement is in surgery today but I would contact him to get his recommendations. Patient verbalized understanding.

## 2017-04-18 ENCOUNTER — OFFICE VISIT (OUTPATIENT)
Dept: SURGERY | Facility: CLINIC | Age: 42
End: 2017-04-18
Payer: COMMERCIAL

## 2017-04-18 VITALS
BODY MASS INDEX: 33.41 KG/M2 | HEART RATE: 76 BPM | TEMPERATURE: 99 F | DIASTOLIC BLOOD PRESSURE: 98 MMHG | SYSTOLIC BLOOD PRESSURE: 148 MMHG | WEIGHT: 207 LBS

## 2017-04-18 DIAGNOSIS — Z98.890 POST-OPERATIVE STATE: Primary | ICD-10-CM

## 2017-04-18 PROCEDURE — 99024 POSTOP FOLLOW-UP VISIT: CPT | Mod: S$GLB,,, | Performed by: PHYSICIAN ASSISTANT

## 2017-04-18 PROCEDURE — 99999 PR PBB SHADOW E&M-EST. PATIENT-LVL III: CPT | Mod: PBBFAC,,, | Performed by: PHYSICIAN ASSISTANT

## 2017-04-18 RX ORDER — HYDROCODONE BITARTRATE AND ACETAMINOPHEN 5; 325 MG/1; MG/1
1 TABLET ORAL EVERY 4 HOURS PRN
Qty: 15 TABLET | Refills: 0 | Status: SHIPPED | OUTPATIENT
Start: 2017-04-18

## 2017-04-18 NOTE — MR AVS SNAPSHOT
[facility]  [address] [place] [address]  Phone: [phone]  Fax: [phone]                  Radha Flores   2017 1:00 PM   Office Visit    Description:  Female : 1975   Provider:  Karly Meza PA-C   Department:  [facility]           Reason for Visit     Post-op Evaluation                To Do List           Future Appointments        Provider Department Dept Phone    2017 9:00 AM Peter Clement MD [facility] [phone]      Goals (5 Years of Data)     None       These Medications        Disp Refills Start End    hydrocodone-acetaminophen 5-325mg (NORCO) 5-325 mg per tablet 15 tablet 0 2017     Take 1 tablet by mouth every 4 (four) hours as needed for Pain. - Oral    Pharmacy: [facility] - [place], [place] - [address] Ph #: [phone]         [facility]     [facility] On Call Nurse Care Line -  Assistance  Unless otherwise directed by your provider, please contact [facility], our nurse care line that is available for  assistance.     Registered nurses in the [facility] provide: appointment scheduling, clinical advisement, health education, and other advisory services.  Call: [phone] (toll free)               Medications           Message regarding Medications     Verify the changes and/or additions to your medication regime listed below are the same as discussed with your clinician today.  If any of these changes or additions are incorrect, please notify your healthcare provider.             Verify that the below list of medications is an accurate representation of the medications you are currently taking.  If none reported, the list may be blank. If incorrect, please contact your healthcare provider. Carry this list with you in case of emergency.           Current Medications     amlodipine (NORVASC) 10 MG tablet Take 10 mg by mouth.     clotrimazole-betamethasone (LOTRISONE) lotion Apply topically.    hydrocodone-acetaminophen 5-325mg (NORCO) 5-325 mg per tablet Take 1 tablet by mouth every 4 (four) hours as needed for Pain.    losartan (COZAAR) 100 MG tablet Take 100 mg by mouth.    valacyclovir (VALTREX) 1000 MG tablet TAKE 1 TABLET BY MOUTH TWICE DAILY    nebivolol (BYSTOLIC) 10 MG Tab Take 10 mg by mouth.           Clinical Reference Information           Your Vitals Were     BP Pulse Temp Weight BMI    148/98 76 98.6 °F (37 °C) 93.9 kg (207 lb) 33.41 kg/m2      Blood Pressure          Most Recent Value    BP  (!)  148/98      Allergies as of 4/18/2017     No Known Allergies      Immunizations Administered on Date of Encounter - 4/18/2017     None      Language Assistance Services     ATTENTION: Language assistance services are available, free of charge. Please call [phone].      ATENCIÓN: Si habla nawaf, tiene a reis disposición servicios gratuitos de asistencia lingüística. Llame al [phone].     CHÚ Ý: N?u b?n nói Ti?ng Vi?t, có các d?ch v? h? tr? ngôn ng? mi?n phí lucinah cho b?n. G?i s? [phone].         [facility] complies with applicable Federal civil rights laws and does not discriminate on the basis of race, color, national origin, age, disability, or sex.

## 2017-04-18 NOTE — PROGRESS NOTES
Subjective:       Patient ID: Radha Flores is a 41 y.o. female.    Chief Complaint: No chief complaint on file.    HPI   S/p excision of left perineal hidradenitis. She is doing well. She does report the lower edge of her incision is still open and she occasionally sees a small amount of blood from wound.   Review of Systems    Objective:      Physical Exam   Constitutional: She appears well-developed and well-nourished. No distress.   Genitourinary:   Genitourinary Comments: Left perineal incision, healing well with granulation tissue at bottom of incision. Treated with silver nitrate. No signs of infection.    Vitals reviewed.      FINAL PATHOLOGIC DIAGNOSIS  1  Left perineal hidradenitis.  Skin with deep dermal acute and chronic inflammation and periadnexal inflammation consistent with hidradenitis.  See note.  Note: This resection also contains isolated fragments of intradermal squamous epithelium and acellular keratin; a  ruptured epidermal inclusion cyst cannot be excluded.  Assessment:    s/p excision of hidradenitis - granulation tissue treated with silver nitrate.   Plan:       -keep wound dry and clean  -f/u in 2 weeks for wound check, may need another silver nitrate application.

## 2017-05-08 ENCOUNTER — OFFICE VISIT (OUTPATIENT)
Dept: SURGERY | Facility: CLINIC | Age: 42
End: 2017-05-08
Payer: COMMERCIAL

## 2017-05-08 VITALS
TEMPERATURE: 99 F | BODY MASS INDEX: 33.88 KG/M2 | WEIGHT: 209.88 LBS | DIASTOLIC BLOOD PRESSURE: 86 MMHG | HEART RATE: 78 BPM | SYSTOLIC BLOOD PRESSURE: 135 MMHG

## 2017-05-08 DIAGNOSIS — L73.2 HIDRADENITIS SUPPURATIVA OF ANUS: Primary | ICD-10-CM

## 2017-05-08 PROCEDURE — 99999 PR PBB SHADOW E&M-EST. PATIENT-LVL III: CPT | Mod: PBBFAC,,, | Performed by: SURGERY

## 2017-05-08 PROCEDURE — 99024 POSTOP FOLLOW-UP VISIT: CPT | Mod: S$GLB,,, | Performed by: SURGERY

## 2017-05-08 NOTE — PROGRESS NOTES
Subjective:       Patient ID: Radha Flores is a 41 y.o. female.    Chief Complaint: No chief complaint on file.    HPI   S/p excision of left perineal hidradenitis 3/23/17 presents for wound check  Review of Systems    Objective:      Physical Exam   Constitutional: She appears well-developed and well-nourished. No distress.   Genitourinary:   Genitourinary Comments: Left perineal incision well healed, no opening/drainage   Vitals reviewed.      FINAL PATHOLOGIC DIAGNOSIS  1  Left perineal hidradenitis.  Skin with deep dermal acute and chronic inflammation and periadnexal inflammation consistent with hidradenitis.  See note.  Note: This resection also contains isolated fragments of intradermal squamous epithelium and acellular keratin; a  ruptured epidermal inclusion cyst cannot be excluded.  Assessment:    s/p excision of hidradenitis   Plan:       Well healed  F/u prn

## 2017-05-19 ENCOUNTER — OFFICE VISIT (OUTPATIENT)
Dept: PODIATRY | Facility: CLINIC | Age: 42
End: 2017-05-19
Payer: COMMERCIAL

## 2017-05-19 ENCOUNTER — HOSPITAL ENCOUNTER (OUTPATIENT)
Dept: RADIOLOGY | Facility: HOSPITAL | Age: 42
Discharge: HOME | End: 2017-05-19
Attending: PODIATRIST
Payer: COMMERCIAL

## 2017-05-19 VITALS
DIASTOLIC BLOOD PRESSURE: 107 MMHG | HEIGHT: 66 IN | WEIGHT: 211.88 LBS | SYSTOLIC BLOOD PRESSURE: 153 MMHG | BODY MASS INDEX: 34.05 KG/M2 | HEART RATE: 75 BPM

## 2017-05-19 DIAGNOSIS — M20.5X9 HALLUX LIMITUS, UNSPECIFIED LATERALITY: Primary | ICD-10-CM

## 2017-05-19 DIAGNOSIS — M20.5X9 HALLUX LIMITUS, UNSPECIFIED LATERALITY: ICD-10-CM

## 2017-05-19 PROCEDURE — 73630 X-RAY EXAM OF FOOT: CPT | Mod: 26,50,, | Performed by: RADIOLOGY

## 2017-05-19 PROCEDURE — 73630 X-RAY EXAM OF FOOT: CPT | Mod: 50,TC,PO

## 2017-05-19 PROCEDURE — 99999 PR PBB SHADOW E&M-EST. PATIENT-LVL III: CPT | Mod: PBBFAC,,, | Performed by: PODIATRIST

## 2017-05-19 PROCEDURE — 99203 OFFICE O/P NEW LOW 30 MIN: CPT | Mod: S$GLB,,, | Performed by: PODIATRIST

## 2017-05-19 RX ORDER — METHYLPREDNISOLONE 4 MG/1
4 TABLET ORAL DAILY
Qty: 1 PACKAGE | Refills: 0 | Status: SHIPPED | OUTPATIENT
Start: 2017-05-19

## 2017-05-19 NOTE — MR AVS SNAPSHOT
[facility]  [address] [place] [address]  Phone: [phone]  Fax: [phone]                  Radha Flores   2017 9:00 AM   Office Visit    Description:  Female : 1975   Provider:  Karine Christianson DPM   Department:  [facility]           Reason for Visit     Foot Problem           Diagnoses this Visit        Comments    Hallux limitus, unspecified laterality    -  Primary            To Do List           Future Appointments        Provider Department Dept Phone    2017 9:45 AM [facility] [phone]    2017 9:20 AM Karine Christianson DPM [facility] [phone]      Goals (5 Years of Data)     None       These Medications        Disp Refills Start End    methylPREDNISolone (MEDROL DOSEPACK) 4 mg tablet 1 Package 0 2017     Take 1 tablet (4 mg total) by mouth once daily. Use as instructed on dose pack - Oral    Pharmacy: [facility] - [place], [place] - [address] Ph #: [phone]         [facility]     [facility] Care [facility] -  Assistance  Unless otherwise directed by your provider, please contact [facility], our nurse care line that is available for  assistance.     Registered nurses in the [facility] provide: appointment scheduling, clinical advisement, health education, and other advisory services.  Call: [phone] (toll free)               Medications           Message regarding Medications     Verify the changes and/or additions to your medication regime listed below are the same as discussed with your clinician today.  If any of these changes or additions are incorrect, please notify your healthcare provider.        START taking these NEW medications        Refills    methylPREDNISolone (MEDROL DOSEPACK) 4 mg tablet 0    Sig: Take 1 tablet (4 mg total) by mouth once daily. Use as instructed on dose pack    Class: Normal    Route: Oral          "  Verify that the below list of medications is an accurate representation of the medications you are currently taking.  If none reported, the list may be blank. If incorrect, please contact your healthcare provider. Carry this list with you in case of emergency.           Current Medications     amlodipine (NORVASC) 10 MG tablet Take 10 mg by mouth.    clotrimazole-betamethasone (LOTRISONE) lotion Apply topically.    hydrocodone-acetaminophen 5-325mg (NORCO) 5-325 mg per tablet Take 1 tablet by mouth every 4 (four) hours as needed for Pain.    losartan (COZAAR) 100 MG tablet Take 100 mg by mouth.    valacyclovir (VALTREX) 1000 MG tablet TAKE 1 TABLET BY MOUTH TWICE DAILY    methylPREDNISolone (MEDROL DOSEPACK) 4 mg tablet Take 1 tablet (4 mg total) by mouth once daily. Use as instructed on dose pack    nebivolol (BYSTOLIC) 10 MG Tab Take 10 mg by mouth.           Clinical Reference Information           Your Vitals Were     BP Pulse Height Weight BMI    153/107 (BP Location: Right arm, Patient Position: Sitting, BP Method: Automatic) 75 5' 6" (1.676 m) 96.1 kg (211 lb 13.8 oz) 34.2 kg/m2      Blood Pressure          Most Recent Value    BP  (!)  153/107 [blood pressure med 10 mins ago.]      Allergies as of 5/19/2017     No Known Allergies      Immunizations Administered on Date of Encounter - 5/19/2017     None      Orders Placed During Today's Visit     Future Labs/Procedures Expected by Expires    X-Ray Foot Complete Bilateral  5/19/2017 5/19/2018      Language Assistance Services     ATTENTION: Language assistance services are available, free of charge. Please call [phone].      ATENCIÓN: Si habla español, tiene a reis disposición servicios gratuitos de asistencia lingüística. Llame al [phone].     CHÚ Ý: N?u b?n nói Ti?ng Vi?t, có các d?ch v? h? tr? ngôn ng? mi?n phí dành cho b?n. G?i s? [phone].         [facility] complies with applicable Federal civil rights laws and does not " discriminate on the basis of race, color, national origin, age, disability, or sex.

## 2017-05-19 NOTE — PROGRESS NOTES
Subjective:     Patient ID: Radha Flores is a 41 y.o. female.    Chief Complaint: Foot Problem (Patient states she can no longer wear heels and her great toes get stiff and it hurts to move them (left foot is worse). Patient states no current pain. )    Radha is a 41 y.o. female who presents to the podiatry clinic  with complaint of  bilateral, left>right foot pain. Onset of the symptoms was several years ago. Precipitating event: none known. Current symptoms include: ability to bear weight, but with some pain. Aggravating factors: standing and walking. Symptoms have gradually worsened. Patient has had no prior foot problems. Evaluation to date: none. Treatment to date: some form of exerices given by [facility] doctor with no real relief. . Patients rates pain 9/10 on pain scale.    Patient Active Problem List   Diagnosis   (none) - all problems resolved or deleted       Medication List with Changes/Refills   New Medications    METHYLPREDNISOLONE (MEDROL DOSEPACK) 4 MG TABLET    Take 1 tablet (4 mg total) by mouth once daily. Use as instructed on dose pack   Current Medications    AMLODIPINE (NORVASC) 10 MG TABLET    Take 10 mg by mouth.    CLOTRIMAZOLE-BETAMETHASONE (LOTRISONE) LOTION    Apply topically.    HYDROCODONE-ACETAMINOPHEN 5-325MG (NORCO) 5-325 MG PER TABLET    Take 1 tablet by mouth every 4 (four) hours as needed for Pain.    LOSARTAN (COZAAR) 100 MG TABLET    Take 100 mg by mouth.    NEBIVOLOL (BYSTOLIC) 10 MG TAB    Take 10 mg by mouth.    VALACYCLOVIR (VALTREX) 1000 MG TABLET    TAKE 1 TABLET BY MOUTH TWICE DAILY       Review of patient's allergies indicates:  No Known Allergies    Past Surgical History:   Procedure Laterality Date    Excision Hidradenitis of Anus  03/23/2017    HYSTERECTOMY  2011    [facility] for hypermen and abnl paps       Family History   Problem Relation Age of Onset    Diabetes Maternal Grandmother     Diabetes Mother     Breast cancer Neg Hx     Colon cancer Neg Hx      "Ovarian cancer Neg Hx        Social History     Social History    Marital status:      Spouse name: N/A    Number of children: N/A    Years of education: N/A     Occupational History    Not on file.     Social History Main Topics    Smoking status: Never Smoker    Smokeless tobacco: Not on file    Alcohol use No    Drug use: No    Sexual activity: Yes     Partners: Male      Comment:      Other Topics Concern    Not on file     Social History Narrative    No narrative on file       Vitals:    05/19/17 0917   BP: (!) 153/107   Pulse: 75   Weight: 96.1 kg (211 lb 13.8 oz)   Height: 5' 6" (1.676 m)   PainSc: 0-No pain       Review of Systems   Constitutional: Negative for chills and fever.   Respiratory: Negative for shortness of breath.    Cardiovascular: Negative for chest pain, palpitations, orthopnea, claudication and leg swelling.   Gastrointestinal: Negative for diarrhea, nausea and vomiting.   Musculoskeletal: Positive for joint pain.   Skin: Negative for rash.   Neurological: Negative for dizziness, tingling, sensory change, focal weakness and weakness.   Psychiatric/Behavioral: Negative.          Objective:       PHYSICAL EXAM: Apperance: Alert and orient in no distress,well developed, and with good attention to grooming and body habits  Patient presents ambulating in flat tennis shoes.  Lower Extremity Physical Exam:  VASCULAR: Dorsalis pedis pulses 2/4 bilateral and Posterior Tibial pulses 2/4 bilateral. Capillary fill time <4 seconds bilateral. No edema observed bilateral. Varicosities absent bilateral. Skin temperature of the lower extremities is warm to warm, proximal to distal. Hair growth WNL bilateral.  DERMATOLOGICAL: No skin rashes, subcutaneous nodules, lesions, or ulcers observed bilateral.  NEUROLOGICAL: Light touch, sharp-dull, proprioception all present and equal bilaterally.    MUSCULOSKELETAL: Muscle strength is 5/5 for foot inverters, everters, plantarflexors, and " dorsiflexors. Muscle tone is normal. (+) pain on palpation of bilateral 1st MPJ ROM..        Assessment:       Encounter Diagnosis   Name Primary?    Hallux limitus, unspecified laterality Yes         Plan:   Hallux limitus, unspecified laterality  -     X-Ray Foot Complete Bilateral; Future; Expected date: 05/19/2017  -     methylPREDNISolone (MEDROL DOSEPACK) 4 mg tablet; Take 1 tablet (4 mg total) by mouth once daily. Use as instructed on dose pack  Dispense: 1 Package; Refill: 0      I counseled the patient on her conditions, regarding findings of my examination, my impressions, and usual treatment plan.   The patient and I reviewed the types of shoes she should be wearing, my recommendation includes generally the best time of the day for a shoe fitting is the afternoon, shoes with a wide toe box, very good cushion, and tennis shoes with removable inner soles.The patient and I reviewed my recommendations for over-the-counter orthotic inserts.   Prescribed Medrol Dosepak to be taken as directed on package. Discussed possible increase in blood sugar with taking steroid medication. Patient advised on the possible elevation of blood pressure sugar and caution to take pills as prescribed and to discontinue use if symptoms arise, patient agreed.  Patient instructed on adequate icing techniques. Patient should ice the affected area at least once per day x 10 minutes for 10 days . I advised the  patient that extra icing would also be beneficial to ensure adequate anti inflammatory effect.   Ordered bilateral foot x-rays.   Patient to return in 6-8 weeks.             Karine Christianson DPM  [facility]

## (undated) DEVICE — ELECTRODE REM PLYHSV RETURN 9

## (undated) DEVICE — SEE MEDLINE ITEM 152739

## (undated) DEVICE — SEE MEDLINE ITEM 152622

## (undated) DEVICE — SEE MEDLINE ITEM 157027

## (undated) DEVICE — SUT VICRYL PLUS 3-0 SH 18IN

## (undated) DEVICE — GAUZE SPONGE 4X4 12PLY

## (undated) DEVICE — SUT MONOCRYL 4-0 PS-2

## (undated) DEVICE — SEE MEDLINE ITEM 157117

## (undated) DEVICE — TAPE CLOTH SOFT MEDIPORE 4IN

## (undated) DEVICE — COVER OVERHEAD SURG LT BLUE

## (undated) DEVICE — SOL 9P NACL IRR PIC IL

## (undated) DEVICE — UNDERGLOVES BIOGEL PI SZ 7 LF

## (undated) DEVICE — NDL SAFETY 25G X 1.5 ECLIPSE

## (undated) DEVICE — SYR 10CC LUER LOCK

## (undated) DEVICE — APPLICATOR CHLORAPREP ORN 26ML

## (undated) DEVICE — GLOVE SURGICAL LATEX SZ 7